# Patient Record
Sex: FEMALE | Race: WHITE | NOT HISPANIC OR LATINO | URBAN - METROPOLITAN AREA
[De-identification: names, ages, dates, MRNs, and addresses within clinical notes are randomized per-mention and may not be internally consistent; named-entity substitution may affect disease eponyms.]

---

## 2020-03-25 ENCOUNTER — OFFICE VISIT (OUTPATIENT)
Dept: FAMILY MEDICINE | Facility: CLINIC | Age: 85
End: 2020-03-25
Payer: MEDICARE

## 2020-03-25 DIAGNOSIS — R53.1 WEAKNESS: ICD-10-CM
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
## 2020-03-25 DIAGNOSIS — M25.571 ACUTE RIGHT ANKLE PAIN: ICD-10-CM

## 2020-03-25 DIAGNOSIS — M79.604 RIGHT LEG PAIN: ICD-10-CM

## 2020-03-25 PROCEDURE — 1159F PR MEDICATION LIST DOCUMENTED IN MEDICAL RECORD: ICD-10-PCS | Mod: 95,,, | Performed by: INTERNAL MEDICINE

## 2020-03-25 PROCEDURE — 1159F MED LIST DOCD IN RCRD: CPT | Mod: 95,,, | Performed by: INTERNAL MEDICINE

## 2020-03-25 PROCEDURE — 99204 OFFICE O/P NEW MOD 45 MIN: CPT | Mod: 95,,, | Performed by: INTERNAL MEDICINE

## 2020-03-25 PROCEDURE — 99204 PR OFFICE/OUTPT VISIT, NEW, LEVL IV, 45-59 MIN: ICD-10-PCS | Mod: 95,,, | Performed by: INTERNAL MEDICINE

## 2020-03-25 NOTE — PROGRESS NOTES
SUBJECTIVE     No chief complaint on file.      HPI  Cece Figueroa is a 89 y.o. female with multiple medical diagnoses as listed in the medical history and problem list that presents for evaluation of fall Friday night trying to get into her wheelchair. HPI provided by pt's daughter. Pt was transferring to the chair, but it slipped causing her to fall onto her RLE. Pt required help up into the chair from family. Denies any head trauma and is at her norm. EMS came and tried to bring her to the hospital, but she refused. Pt now has RLE pain from the knee on down, mostly ankle pain. Her pain is and intermittent throbbing at a 6-7/10. +swelling to the ankle. Pt has not been able to apply pressure 2/2 pain. Of note, pt lives alone and needs more assistance after this fall.    PAST MEDICAL HISTORY:  History reviewed. No pertinent past medical history.    PAST SURGICAL HISTORY:  History reviewed. No pertinent surgical history.    SOCIAL HISTORY:  Social History     Socioeconomic History    Marital status:      Spouse name: Not on file    Number of children: Not on file    Years of education: Not on file    Highest education level: Not on file   Occupational History    Not on file   Social Needs    Financial resource strain: Not on file    Food insecurity:     Worry: Not on file     Inability: Not on file    Transportation needs:     Medical: Not on file     Non-medical: Not on file   Tobacco Use    Smoking status: Not on file   Substance and Sexual Activity    Alcohol use: Not on file    Drug use: Not on file    Sexual activity: Not on file   Lifestyle    Physical activity:     Days per week: 0 days     Minutes per session: 0 min    Stress: Not at all   Relationships    Social connections:     Talks on phone: More than three times a week     Gets together: Never     Attends [employer] service: Not on file     Active member of club or organization: No     Attends meetings of clubs or  organizations: Never     Relationship status:    Other Topics Concern    Not on file   Social History Narrative    Not on file       FAMILY HISTORY:  History reviewed. No pertinent family history.    ALLERGIES AND MEDICATIONS: updated and reviewed.  Review of patient's allergies indicates:  No Known Allergies  No current outpatient medications on file.     No current facility-administered medications for this visit.        ROS  Review of Systems   Constitutional: Positive for activity change. Negative for unexpected weight change.   HENT: Positive for hearing loss. Negative for rhinorrhea and trouble swallowing.    Eyes: Positive for visual disturbance. Negative for discharge.   Respiratory: Negative for chest tightness and wheezing.    Cardiovascular: Negative for chest pain and palpitations.   Gastrointestinal: Negative for blood in stool, constipation, diarrhea and vomiting.   Endocrine: Negative for polydipsia and polyuria.   Genitourinary: Positive for difficulty urinating. Negative for dysuria, hematuria and menstrual problem.   Musculoskeletal: Positive for arthralgias and joint swelling. Negative for neck pain.   Skin: Negative for rash and wound.   Neurological: Negative for weakness and headaches.   Psychiatric/Behavioral: Positive for confusion. Negative for dysphoric mood.         OBJECTIVE     Physical Exam  There were no vitals filed for this visit. There is no height or weight on file to calculate BMI.            Physical Exam   Constitutional: No distress.   HENT:   Head: Normocephalic and atraumatic.   Pulmonary/Chest: Effort normal. No respiratory distress.   Neurological: She is alert.   Skin: Skin is warm and dry. No rash noted. No erythema.   Psychiatric: She has a normal mood and affect. Her behavior is normal. Judgment and thought content normal.         Health Maintenance       Date Due Completion Date    Lipid Panel 02/01/1929 ---    TETANUS VACCINE 02/01/1947 ---    Shingles  Vaccine (1 of 2) 02/01/1979 ---    Pneumococcal Vaccine (65+ Low/Medium Risk) (1 of 2 - PCV13) 02/01/1994 ---    Influenza Vaccine (1) 09/01/2019 ---            ASSESSMENT     89 y.o. female with     1. Fall from chair, initial encounter    2. Right leg pain    3. Acute right ankle pain    4. Weakness        PLAN:     1. Fall from chair, initial encounter  - Pt unable to apply pressure to RLE after fall and worsening debility, so will have her do xrays for further eval and start home health as she lives alone and needs more assistance  - X-Ray Ankle Complete Right; Future  - X-Ray Hip 2 View Right; Future  - X-Ray Knee 3 View Right; Future  - Ambulatory referral/consult to Home Health; Future    2. Right leg pain  - Recommend RICE therapy; pt to take Tylenol q6 prn pain  - X-Ray Ankle Complete Right; Future  - X-Ray Hip 2 View Right; Future  - X-Ray Knee 3 View Right; Future  - Ambulatory referral/consult to Home Health; Future    3. Acute right ankle pain  - As above  - X-Ray Ankle Complete Right; Future  - Ambulatory referral/consult to Home Health; Future    4. Weakness  - Ambulatory referral/consult to Home Health; Future        RTC in 1-2 weeks as needed for any acute worsening of current condition or failure to improve        Consult Start Time: 03/25/2020 13:52  Consult End Time: 03/25/2020 14:12            Janel Barajas MD  03/25/2020 1:53 PM        No follow-ups on file.

## 2020-03-26 ENCOUNTER — TELEPHONE (OUTPATIENT)
Dept: FAMILY MEDICINE | Facility: CLINIC | Age: 85
End: 2020-03-26

## 2020-03-26 NOTE — TELEPHONE ENCOUNTER
Received a phone call from [facility] requesting physical theraphy for patient , verbal ok per DR Karl payne .

## 2020-03-27 ENCOUNTER — TELEPHONE (OUTPATIENT)
Dept: FAMILY MEDICINE | Facility: CLINIC | Age: 85
End: 2020-03-27

## 2020-03-27 DIAGNOSIS — S82.64XA CLOSED NONDISPLACED FRACTURE OF LATERAL MALLEOLUS OF RIGHT FIBULA, INITIAL ENCOUNTER: Primary | ICD-10-CM

## 2020-03-27 PROBLEM — S72.051A CLOSED FRACTURE OF HEAD OF RIGHT FEMUR: Status: ACTIVE | Noted: 2020-03-27

## 2020-03-27 PROBLEM — M17.0 PRIMARY OSTEOARTHRITIS OF BOTH KNEES: Status: ACTIVE | Noted: 2020-03-27

## 2020-03-28 PROCEDURE — G0180 PR HOME HEALTH MD CERTIFICATION: ICD-10-PCS | Mod: ,,, | Performed by: INTERNAL MEDICINE

## 2020-03-28 PROCEDURE — G0180 MD CERTIFICATION HHA PATIENT: HCPCS | Mod: ,,, | Performed by: INTERNAL MEDICINE

## 2020-03-30 ENCOUNTER — ANESTHESIA EVENT (OUTPATIENT)
Dept: SURGERY | Facility: HOSPITAL | Age: 85
DRG: 481 | End: 2020-03-30
Payer: MEDICARE

## 2020-03-30 ENCOUNTER — HOSPITAL ENCOUNTER (INPATIENT)
Facility: HOSPITAL | Age: 85
LOS: 3 days | Discharge: HOME HEALTH | DRG: 481 | End: 2020-04-02
Attending: ORTHOPAEDIC SURGERY | Admitting: ORTHOPAEDIC SURGERY
Payer: MEDICARE

## 2020-03-30 ENCOUNTER — TELEPHONE (OUTPATIENT)
Dept: ADMINISTRATIVE | Facility: HOSPITAL | Age: 85
End: 2020-03-30

## 2020-03-30 ENCOUNTER — TELEPHONE (OUTPATIENT)
Dept: ORTHOPEDICS | Facility: CLINIC | Age: 85
End: 2020-03-30

## 2020-03-30 ENCOUNTER — ANESTHESIA (OUTPATIENT)
Dept: SURGERY | Facility: HOSPITAL | Age: 85
DRG: 481 | End: 2020-03-30
Payer: MEDICARE

## 2020-03-30 DIAGNOSIS — Z01.818 PRE-OP EVALUATION: ICD-10-CM

## 2020-03-30 DIAGNOSIS — S72.009A: ICD-10-CM

## 2020-03-30 DIAGNOSIS — S72.001A CLOSED FRACTURE OF RIGHT HIP, INITIAL ENCOUNTER: Primary | ICD-10-CM

## 2020-03-30 DIAGNOSIS — Z01.818 PRE-OP EXAM: ICD-10-CM

## 2020-03-30 LAB
ABO + RH BLD: NORMAL
ANION GAP SERPL CALC-SCNC: 9 MMOL/L (ref 8–16)
BASOPHILS # BLD AUTO: 0.02 K/UL (ref 0–0.2)
BASOPHILS NFR BLD: 0.2 % (ref 0–1.9)
BLD GP AB SCN CELLS X3 SERPL QL: NORMAL
BUN SERPL-MCNC: 17 MG/DL (ref 10–30)
CALCIUM SERPL-MCNC: 10 MG/DL (ref 8.7–10.5)
CHLORIDE SERPL-SCNC: 102 MMOL/L (ref 95–110)
CO2 SERPL-SCNC: 26 MMOL/L (ref 23–29)
CREAT SERPL-MCNC: 0.7 MG/DL (ref 0.5–1.4)
DIFFERENTIAL METHOD: ABNORMAL
EOSINOPHIL # BLD AUTO: 0.1 K/UL (ref 0–0.5)
EOSINOPHIL NFR BLD: 1.2 % (ref 0–8)
ERYTHROCYTE [DISTWIDTH] IN BLOOD BY AUTOMATED COUNT: 13.7 % (ref 11.5–14.5)
EST. GFR  (AFRICAN AMERICAN): >60 ML/MIN/1.73 M^2
EST. GFR  (NON AFRICAN AMERICAN): >60 ML/MIN/1.73 M^2
GLUCOSE SERPL-MCNC: 139 MG/DL (ref 70–110)
HCT VFR BLD AUTO: 34.4 % (ref 37–48.5)
HGB BLD-MCNC: 10.5 G/DL (ref 12–16)
IMM GRANULOCYTES # BLD AUTO: 0.09 K/UL (ref 0–0.04)
IMM GRANULOCYTES NFR BLD AUTO: 0.9 % (ref 0–0.5)
LYMPHOCYTES # BLD AUTO: 1.3 K/UL (ref 1–4.8)
LYMPHOCYTES NFR BLD: 13.7 % (ref 18–48)
MCH RBC QN AUTO: 28 PG (ref 27–31)
MCHC RBC AUTO-ENTMCNC: 30.5 G/DL (ref 32–36)
MCV RBC AUTO: 92 FL (ref 82–98)
MONOCYTES # BLD AUTO: 0.8 K/UL (ref 0.3–1)
MONOCYTES NFR BLD: 8.2 % (ref 4–15)
NEUTROPHILS # BLD AUTO: 7.3 K/UL (ref 1.8–7.7)
NEUTROPHILS NFR BLD: 75.8 % (ref 38–73)
NRBC BLD-RTO: 0 /100 WBC
PLATELET # BLD AUTO: 385 K/UL (ref 150–350)
PMV BLD AUTO: 9.5 FL (ref 9.2–12.9)
POTASSIUM SERPL-SCNC: 3.9 MMOL/L (ref 3.5–5.1)
RBC # BLD AUTO: 3.75 M/UL (ref 4–5.4)
SODIUM SERPL-SCNC: 137 MMOL/L (ref 136–145)
WBC # BLD AUTO: 9.66 K/UL (ref 3.9–12.7)

## 2020-03-30 PROCEDURE — 63600175 PHARM REV CODE 636 W HCPCS: Performed by: ORTHOPAEDIC SURGERY

## 2020-03-30 PROCEDURE — 71000039 HC RECOVERY, EACH ADD'L HOUR: Performed by: ORTHOPAEDIC SURGERY

## 2020-03-30 PROCEDURE — D9220A PRA ANESTHESIA: Mod: CRNA,,, | Performed by: NURSE ANESTHETIST, CERTIFIED REGISTERED

## 2020-03-30 PROCEDURE — 37000008 HC ANESTHESIA 1ST 15 MINUTES: Performed by: ORTHOPAEDIC SURGERY

## 2020-03-30 PROCEDURE — D9220A PRA ANESTHESIA: Mod: ANES,,, | Performed by: ANESTHESIOLOGY

## 2020-03-30 PROCEDURE — 27201423 OPTIME MED/SURG SUP & DEVICES STERILE SUPPLY: Performed by: ORTHOPAEDIC SURGERY

## 2020-03-30 PROCEDURE — 36000711: Performed by: ORTHOPAEDIC SURGERY

## 2020-03-30 PROCEDURE — D9220A PRA ANESTHESIA: ICD-10-PCS | Mod: CRNA,,, | Performed by: NURSE ANESTHETIST, CERTIFIED REGISTERED

## 2020-03-30 PROCEDURE — 93005 ELECTROCARDIOGRAM TRACING: CPT

## 2020-03-30 PROCEDURE — C9290 INJ, BUPIVACAINE LIPOSOME: HCPCS | Performed by: ORTHOPAEDIC SURGERY

## 2020-03-30 PROCEDURE — 86850 RBC ANTIBODY SCREEN: CPT

## 2020-03-30 PROCEDURE — 71000033 HC RECOVERY, INTIAL HOUR: Performed by: ORTHOPAEDIC SURGERY

## 2020-03-30 PROCEDURE — C1713 ANCHOR/SCREW BN/BN,TIS/BN: HCPCS | Performed by: ORTHOPAEDIC SURGERY

## 2020-03-30 PROCEDURE — 36000710: Performed by: ORTHOPAEDIC SURGERY

## 2020-03-30 PROCEDURE — 93010 ELECTROCARDIOGRAM REPORT: CPT | Mod: ,,, | Performed by: INTERNAL MEDICINE

## 2020-03-30 PROCEDURE — 85025 COMPLETE CBC W/AUTO DIFF WBC: CPT

## 2020-03-30 PROCEDURE — 11000001 HC ACUTE MED/SURG PRIVATE ROOM

## 2020-03-30 PROCEDURE — 25000003 PHARM REV CODE 250: Performed by: NURSE ANESTHETIST, CERTIFIED REGISTERED

## 2020-03-30 PROCEDURE — 36415 COLL VENOUS BLD VENIPUNCTURE: CPT

## 2020-03-30 PROCEDURE — C1769 GUIDE WIRE: HCPCS | Performed by: ORTHOPAEDIC SURGERY

## 2020-03-30 PROCEDURE — 25000003 PHARM REV CODE 250: Performed by: ORTHOPAEDIC SURGERY

## 2020-03-30 PROCEDURE — D9220A PRA ANESTHESIA: ICD-10-PCS | Mod: ANES,,, | Performed by: ANESTHESIOLOGY

## 2020-03-30 PROCEDURE — 86920 COMPATIBILITY TEST SPIN: CPT

## 2020-03-30 PROCEDURE — 37000009 HC ANESTHESIA EA ADD 15 MINS: Performed by: ORTHOPAEDIC SURGERY

## 2020-03-30 PROCEDURE — 63600175 PHARM REV CODE 636 W HCPCS: Performed by: NURSE ANESTHETIST, CERTIFIED REGISTERED

## 2020-03-30 PROCEDURE — 93010 EKG 12-LEAD: ICD-10-PCS | Mod: ,,, | Performed by: INTERNAL MEDICINE

## 2020-03-30 PROCEDURE — 80048 BASIC METABOLIC PNL TOTAL CA: CPT

## 2020-03-30 DEVICE — SCREW LOCKING 5 X 42.5: Type: IMPLANTABLE DEVICE | Site: HIP | Status: FUNCTIONAL

## 2020-03-30 DEVICE — SCREW LAG TITANIUM 10.5X95: Type: IMPLANTABLE DEVICE | Site: HIP | Status: FUNCTIONAL

## 2020-03-30 RX ORDER — SODIUM CHLORIDE 0.9 % (FLUSH) 0.9 %
10 SYRINGE (ML) INJECTION
Status: DISCONTINUED | OUTPATIENT
Start: 2020-03-30 | End: 2020-04-02 | Stop reason: HOSPADM

## 2020-03-30 RX ORDER — MORPHINE SULFATE 10 MG/ML
2 INJECTION INTRAMUSCULAR; INTRAVENOUS; SUBCUTANEOUS EVERY 4 HOURS PRN
Status: DISCONTINUED | OUTPATIENT
Start: 2020-03-30 | End: 2020-04-02 | Stop reason: HOSPADM

## 2020-03-30 RX ORDER — SODIUM CHLORIDE, SODIUM LACTATE, POTASSIUM CHLORIDE, CALCIUM CHLORIDE 600; 310; 30; 20 MG/100ML; MG/100ML; MG/100ML; MG/100ML
INJECTION, SOLUTION INTRAVENOUS CONTINUOUS
Status: ACTIVE | OUTPATIENT
Start: 2020-03-30 | End: 2020-03-30

## 2020-03-30 RX ORDER — MUPIROCIN 20 MG/G
OINTMENT TOPICAL 2 TIMES DAILY
Status: DISCONTINUED | OUTPATIENT
Start: 2020-03-30 | End: 2020-04-02 | Stop reason: HOSPADM

## 2020-03-30 RX ORDER — ACETAMINOPHEN 10 MG/ML
1000 INJECTION, SOLUTION INTRAVENOUS ONCE
Status: DISCONTINUED | OUTPATIENT
Start: 2020-03-30 | End: 2020-03-30

## 2020-03-30 RX ORDER — HYDROCODONE BITARTRATE AND ACETAMINOPHEN 5; 325 MG/1; MG/1
1 TABLET ORAL EVERY 4 HOURS PRN
Status: DISCONTINUED | OUTPATIENT
Start: 2020-03-30 | End: 2020-03-30

## 2020-03-30 RX ORDER — ACETAMINOPHEN 10 MG/ML
1000 INJECTION, SOLUTION INTRAVENOUS ONCE
Status: COMPLETED | OUTPATIENT
Start: 2020-03-30 | End: 2020-03-30

## 2020-03-30 RX ORDER — DOCUSATE SODIUM 100 MG/1
100 CAPSULE, LIQUID FILLED ORAL 2 TIMES DAILY
Status: DISCONTINUED | OUTPATIENT
Start: 2020-03-30 | End: 2020-04-02 | Stop reason: HOSPADM

## 2020-03-30 RX ORDER — ENOXAPARIN SODIUM 100 MG/ML
40 INJECTION SUBCUTANEOUS
Status: DISCONTINUED | OUTPATIENT
Start: 2020-03-31 | End: 2020-04-02 | Stop reason: HOSPADM

## 2020-03-30 RX ORDER — FENTANYL CITRATE 50 UG/ML
INJECTION, SOLUTION INTRAMUSCULAR; INTRAVENOUS
Status: DISCONTINUED | OUTPATIENT
Start: 2020-03-30 | End: 2020-03-30

## 2020-03-30 RX ORDER — ROCURONIUM BROMIDE 10 MG/ML
INJECTION, SOLUTION INTRAVENOUS
Status: DISCONTINUED | OUTPATIENT
Start: 2020-03-30 | End: 2020-03-30

## 2020-03-30 RX ORDER — ONDANSETRON 2 MG/ML
INJECTION INTRAMUSCULAR; INTRAVENOUS
Status: DISCONTINUED | OUTPATIENT
Start: 2020-03-30 | End: 2020-03-30

## 2020-03-30 RX ORDER — OXYCODONE HYDROCHLORIDE 5 MG/1
5 TABLET ORAL EVERY 4 HOURS PRN
Status: DISCONTINUED | OUTPATIENT
Start: 2020-03-30 | End: 2020-04-02 | Stop reason: HOSPADM

## 2020-03-30 RX ORDER — GLYCOPYRROLATE 0.2 MG/ML
INJECTION INTRAMUSCULAR; INTRAVENOUS
Status: DISCONTINUED | OUTPATIENT
Start: 2020-03-30 | End: 2020-03-30

## 2020-03-30 RX ORDER — HYDRALAZINE HYDROCHLORIDE 20 MG/ML
10 INJECTION INTRAMUSCULAR; INTRAVENOUS EVERY 6 HOURS PRN
Status: DISCONTINUED | OUTPATIENT
Start: 2020-03-30 | End: 2020-04-02 | Stop reason: HOSPADM

## 2020-03-30 RX ORDER — NEOSTIGMINE METHYLSULFATE 1 MG/ML
INJECTION, SOLUTION INTRAVENOUS
Status: DISCONTINUED | OUTPATIENT
Start: 2020-03-30 | End: 2020-03-30

## 2020-03-30 RX ORDER — CEFAZOLIN SODIUM 2 G/50ML
2 SOLUTION INTRAVENOUS
Status: DISCONTINUED | OUTPATIENT
Start: 2020-03-30 | End: 2020-03-30

## 2020-03-30 RX ORDER — FENTANYL CITRATE 50 UG/ML
25 INJECTION, SOLUTION INTRAMUSCULAR; INTRAVENOUS EVERY 5 MIN PRN
Status: DISCONTINUED | OUTPATIENT
Start: 2020-03-30 | End: 2020-04-02 | Stop reason: HOSPADM

## 2020-03-30 RX ORDER — POLYETHYLENE GLYCOL 3350 17 G/17G
17 POWDER, FOR SOLUTION ORAL DAILY
Status: DISCONTINUED | OUTPATIENT
Start: 2020-03-30 | End: 2020-04-02 | Stop reason: HOSPADM

## 2020-03-30 RX ORDER — ONDANSETRON 2 MG/ML
4 INJECTION INTRAMUSCULAR; INTRAVENOUS EVERY 12 HOURS PRN
Status: DISCONTINUED | OUTPATIENT
Start: 2020-03-30 | End: 2020-04-02 | Stop reason: HOSPADM

## 2020-03-30 RX ORDER — CEFAZOLIN SODIUM 2 G/50ML
2 SOLUTION INTRAVENOUS
Status: COMPLETED | OUTPATIENT
Start: 2020-03-31 | End: 2020-03-31

## 2020-03-30 RX ORDER — PHENYLEPHRINE HYDROCHLORIDE 10 MG/ML
INJECTION INTRAVENOUS
Status: DISCONTINUED | OUTPATIENT
Start: 2020-03-30 | End: 2020-03-30

## 2020-03-30 RX ORDER — LIDOCAINE HYDROCHLORIDE 20 MG/ML
INJECTION INTRAVENOUS
Status: DISCONTINUED | OUTPATIENT
Start: 2020-03-30 | End: 2020-03-30

## 2020-03-30 RX ORDER — MUPIROCIN 20 MG/G
OINTMENT TOPICAL 2 TIMES DAILY
Status: DISCONTINUED | OUTPATIENT
Start: 2020-03-30 | End: 2020-03-30

## 2020-03-30 RX ORDER — PROPOFOL 10 MG/ML
VIAL (ML) INTRAVENOUS
Status: DISCONTINUED | OUTPATIENT
Start: 2020-03-30 | End: 2020-03-30

## 2020-03-30 RX ORDER — ACETAMINOPHEN 325 MG/1
650 TABLET ORAL EVERY 6 HOURS PRN
Status: DISCONTINUED | OUTPATIENT
Start: 2020-03-30 | End: 2020-04-02 | Stop reason: HOSPADM

## 2020-03-30 RX ADMIN — NEOSTIGMINE METHYLSULFATE 4 MG: 1 INJECTION INTRAVENOUS at 04:03

## 2020-03-30 RX ADMIN — PHENYLEPHRINE HYDROCHLORIDE 150 MCG: 10 INJECTION INTRAVENOUS at 04:03

## 2020-03-30 RX ADMIN — CEFAZOLIN SODIUM 2 G: 1 POWDER, FOR SOLUTION INTRAMUSCULAR; INTRAVENOUS at 03:03

## 2020-03-30 RX ADMIN — SODIUM CHLORIDE, SODIUM LACTATE, POTASSIUM CHLORIDE, AND CALCIUM CHLORIDE: .6; .31; .03; .02 INJECTION, SOLUTION INTRAVENOUS at 04:03

## 2020-03-30 RX ADMIN — PHENYLEPHRINE HYDROCHLORIDE 50 MCG: 10 INJECTION INTRAVENOUS at 04:03

## 2020-03-30 RX ADMIN — MUPIROCIN: 20 OINTMENT TOPICAL at 09:03

## 2020-03-30 RX ADMIN — FENTANYL CITRATE 25 MCG: 50 INJECTION INTRAMUSCULAR; INTRAVENOUS at 04:03

## 2020-03-30 RX ADMIN — PROPOFOL 70 MG: 10 INJECTION, EMULSION INTRAVENOUS at 03:03

## 2020-03-30 RX ADMIN — DOCUSATE SODIUM 100 MG: 100 CAPSULE, LIQUID FILLED ORAL at 09:03

## 2020-03-30 RX ADMIN — SODIUM CHLORIDE, SODIUM LACTATE, POTASSIUM CHLORIDE, AND CALCIUM CHLORIDE: .6; .31; .03; .02 INJECTION, SOLUTION INTRAVENOUS at 03:03

## 2020-03-30 RX ADMIN — ACETAMINOPHEN 1000 MG: 10 INJECTION, SOLUTION INTRAVENOUS at 06:03

## 2020-03-30 RX ADMIN — Medication 50 MG: at 03:03

## 2020-03-30 RX ADMIN — PHENYLEPHRINE HYDROCHLORIDE 200 MCG: 10 INJECTION INTRAVENOUS at 04:03

## 2020-03-30 RX ADMIN — ROCURONIUM BROMIDE 20 MG: 10 INJECTION, SOLUTION INTRAVENOUS at 03:03

## 2020-03-30 RX ADMIN — FENTANYL CITRATE 50 MCG: 50 INJECTION INTRAMUSCULAR; INTRAVENOUS at 03:03

## 2020-03-30 RX ADMIN — PHENYLEPHRINE HYDROCHLORIDE 100 MCG: 10 INJECTION INTRAVENOUS at 04:03

## 2020-03-30 RX ADMIN — ONDANSETRON 4 MG: 2 INJECTION, SOLUTION INTRAMUSCULAR; INTRAVENOUS at 04:03

## 2020-03-30 RX ADMIN — GLYCOPYRROLATE 0.4 MG: 0.2 INJECTION, SOLUTION INTRAMUSCULAR; INTRAVENOUS at 04:03

## 2020-03-30 NOTE — BRIEF OP NOTE
Operative Note       Surgery Date: 3/30/2020     Surgeon(s) and Role:     * Armen Norwood MD - Primary    Pre-op Diagnosis:  Closed fracture of head of right femur, initial encounter [S72.015T]    Post-op Diagnosis:  S/p IM nail    Procedure(s) (LRB):  INSERTION, INTRAMEDULLARY ARMANDO, FEMUR-HIP (Right)    Anesthesia: General    Findings/Key Components:  C/w pre op dx    Core Measure Documentation:  Were antibiotics extended? No  Was the patient administered a VTE Prophylaxis? No. Short procedure; low risk  Estimated Blood Loss: 100ml  IVF: 1000ml  UOP: 210 cc           Specimens (From admission, onward)    None        Implants: patti gamma bridgette  Complications: none           Disposition: PACU - hemodynamically stable.           Condition: Stable    Attestation:  I was present for the entire procedure.

## 2020-03-30 NOTE — H&P
Chief Complaint: RIGHT hip pain right ankle pain    History of Present Illness:  Cece Figueroa is a very pleasant 89 y.o. female who presents with right hip and ankle pain after sustaining a mechanical fall from standing height several days ago.  She presents to  ago. The pain is worse with any movement and relieved with rest.  Patient denies any additional injuries.  There was no loss of consciousness.  She denies having hit her head.  She presents today to clinic with her son who is her power of .  She reports that she has not been able to stand or bear any weight since her above-mentioned follow-up.  She reports and he reports as well that prior to this most recent fall she was able to stand and ambulate using her wheelchair as a walker and accomplished most of her ADLs.  She reports no relevant past medical history.  She reports a remote history of polymyalgia rheumatica and diabetes both of which she reports have been asymptomatic and resolved respectively.  Review of Systems:    Noncontributory except for above       No past medical history on file.    Past Surgical History:   No past surgical history on file.    Social History:  negative tobacco abuse    Allergies:  Review of patient's allergies indicates:  No Known Allergies    Medications:  Current Facility-Administered Medications   Medication Dose Route Frequency Provider Last Rate Last Dose    [START ON 3/31/2020] enoxaparin injection 40 mg  40 mg Subcutaneous Q24H Armen Norwood MD           Physical Exam:   General:  Well developed and well nourished age appropriate female in no acute distress  Cardiovascular: regular rate and rhythm  Respiratory:  Nonlabored breathing, no wheezing  Abdomen: soft, non-tender, non-distended  Musculoskeletal:   Severe pain with any range of motion of right hip  No laceration no abrasion  Right knee full range of motion no effusion no laceration or abrasion no tenderness to palpation  Right ankle  severe tenderness to palpation at lateral malleolus mildly at medial malleolus to minimal ecchymosis no laceration good range of motion sensation intact to light touch throughout bilateral lower extremity palpable distal pulses bilateral lower extremity  Neuro:  Sensation motor intact throughout    Imaging:  Imaging revealed:  Right basicervical femoral neck fracture and right lateral malleolus nondisplaced fracture seen on x-rays.  Full length femur films and chest x-ray pending  EKG and stat labs pending    Diagnosis:  89-year-old female with a right basicervical femoral neck fracture and a right nondisplaced lateral malleolus fracture admitted directly to me for operative fixation of her right hip after the above-mentioned mechanical fall from standing height.  Patient was directly admitted to my service in light of the current corona virus epidemic 2 attempt to keep the patient out of the emergency room which is saturated with patient's with Covid 19.  Had extensive conversation with the patient as well as the son regarding the risks benefits and alternatives as well as anticipated convalescence of operative versus non operative management.  Will attempt to admit the patient to an area the hospital that has no Covid 19 known cases.  Discussed case with Anesthesia and Internal Medicine  Informed consent signed by the son her power of  in my presence today and witnessed.  Surgical site marked    Plan:   1. We discussed both surgical and non surgical options.  Patient and her power of  son would like to proceed with surgical intervention.  Patient understands the inherent risks and benefits and would like to proceed with the following surgical intervention on today's date.  All consents were signed.    Planned Surgical Intervention:  IM nail of right hip non operative management of right lateral malleolus fracture    MD Cuco

## 2020-03-30 NOTE — OP NOTE
03/30/2020    PREOPERATIVE DIAGNOSIS:  Right basicervical femoral neck fx hip fracture.    RIGHT lateral malleolus fracture    POSTOPERATIVE DIAGNOSIS:  Right basicervical hip fracture.    RIGHT lateral malleolus fracture    PROCEDURE:  Right hip IM nailing for intertrochanteric hip fracture. (CPT# 44908)    Non-operative management of RIGHT lateral malleolus fracture    SURGEON:  MARIE Norwood    ASSISTANT:   none.    ANESTHESIA:  general.    ESTIMATED BLOOD LOSS:  100 mL  IVF: 1000ml    UOP: 210    INDICATIONS:  The patient is a 89 y.o. who fell previously.  Clinical evaluation was consistent with hip pathology and radiographs revealed an intertrochanteric hip fracture and a non displaced RIGHT lateral malleolus fracture.  She was admitted to the hospital where preoperative medical clearance was obtained and it was recommended at this time to proceed with intramedullary nailing.  Risks and complications were discussed including, but not limited to risks of anesthetic complications, infections, wound healing complications, nonunion, malunion, hardware failure, pain, stiffness, DVT, pulmonary embolism and death among others and she elected to proceed.    DESCRIPTION OF PROCEDURE:  The patient was taken Operating Room where anesthesia was administered by the Anesthesia Department.  She was then placed in the fracture table and all superficial neurovascular structures were well padded.  The right lower extremity was then sterilely prepped and draped in the normal fashion after fluroscopy was used to confirm adequate reduction.   .    A 4 cm longitudinal incision was made just proximal to the greater trochanter.  The subcutaneous tissue and gluteal fascia were incised.  A threaded guide pin was then placed in the tip of the greater trochanter and extended and introduced into the proximal femur under AP and lateral fluoroscopy.  The proximal reamer was then used to ream proximally. A ball-tipped guide kim was then placed  through the entry site down to the physeal scar of the femur.  This was measured at 380 mm and then was reamed sequentially to a 12.5 mm reamer.  A Malcolm long Gamma3 size 380 mm x 12.5 mm was then passed over the guidewire, which was subsequently removed.  The proximal interlocking device was then placed and a 3-cm longitudinal incision was made over the lateral aspect of the proximal thigh and the fascia ashly was incised.  A threaded guide pin was then placed through the lateral cortex of the femur through the femoral neck and into the femoral head and measured at 95 mm and over reamed.  A 95 mm proximal screw was then placed under fluoroscopy and satisfactory position was noted on AP and lateral fluoroscopy and the setscrew was then set. The proximal interlocking device was then removed.    Distal interlocking was done with a single lateral to medial interlocking screw through a 1 cm incision under fluoroscopic guidance.  All wounds were then thoroughly irrigated.  Subcutaneous tissues were closed with interrupted inverted of  Vicryl.  Skin was approximated using skin staples.  Sterile dressing was applied.  Anesthesia was reversed and she was returned to the Postanesthesia Care Unit in stable condition.    As the attending surgeon I was physically present for the key/critical portions of the procedure.

## 2020-03-30 NOTE — CONSULTS
[facility] Hospital Medicine  Consult Note    Patient Name: Cece Figueroa  MRN: [id number]  Admission Date: 3/30/2020  Hospital Length of Stay: 0 days  Attending Physician: Armen Norwood MD   Primary Care Provider: Janel Barajas MD           Patient information was obtained from patient and ER records.     Consults  Subjective:     Principal Problem: <principal problem not specified>    Chief Complaint: No chief complaint on file.       HPI: 89 y.o. female who presents with right hip and ankle pain after sustaining a mechanical fall from standing height several days ago.  She presents to  ago. The pain is worse with any movement and relieved with rest.  Patient denies any additional injuries.  There was no loss of consciousness.  She denies having hit her head.  She presents today to clinic with her son who is her power of .  She reports that she has not been able to stand or bear any weight since her above-mentioned follow-up.  She reports and he reports as well that prior to this most recent fall she was able to stand and ambulate using her wheelchair as a walker and accomplished most of her ADLs.  She reports no relevant past medical history.  She reports a remote history of polymyalgia rheumatica and diabetes both of which she reports have been asymptomatic and resolved respectively.    Pt admitted to orthopedic surgery service. Hospital medicine consulted for clearance and medical management.    History reviewed. No pertinent past medical history.    History reviewed. No pertinent surgical history.    Review of patient's allergies indicates:  No Known Allergies    No current facility-administered medications on file prior to encounter.      No current outpatient medications on file prior to encounter.     Family History     None        Tobacco Use    Smoking status: Not on file   Substance and Sexual Activity    Alcohol use: Not on file    Drug use: Not on file     Sexual activity: Not on file     Review of Systems   Constitutional: Negative.    HENT: Negative.    Eyes: Negative.    Cardiovascular: Negative.    Gastrointestinal: Negative.    Endocrine: Negative.    Genitourinary: Negative.    Musculoskeletal: Positive for arthralgias and gait problem.   Psychiatric/Behavioral: Negative.      Objective:     Vital Signs (Most Recent):  Temp: 97.9 °F (36.6 °C) (03/30/20 1503)  Pulse: 100 (03/30/20 1503)  Resp: 16 (03/30/20 1503)  BP: (!) 158/71 (03/30/20 1503)  SpO2: 95 % (03/30/20 1503) Vital Signs (24h Range):  Temp:  [97.9 °F (36.6 °C)] 97.9 °F (36.6 °C)  Pulse:  [100] 100  Resp:  [16] 16  SpO2:  [95 %] 95 %  BP: (158)/(71) 158/71     Weight: 60.2 kg (132 lb 11.5 oz)  There is no height or weight on file to calculate BMI.    Physical Exam   Constitutional: She is oriented to person, place, and time. She appears well-developed and well-nourished. No distress.   HENT:   Head: Normocephalic and atraumatic.   Mouth/Throat: Oropharynx is clear and moist.   Eyes: Pupils are equal, round, and reactive to light. EOM are normal.   Neck: Normal range of motion. Neck supple.   Cardiovascular: Regular rhythm and intact distal pulses.   Pulmonary/Chest: Effort normal and breath sounds normal. No respiratory distress.   Abdominal: Soft. Bowel sounds are normal. She exhibits no distension.   Musculoskeletal: She exhibits tenderness and deformity.   Neurological: She is oriented to person, place, and time.   Skin: Capillary refill takes less than 2 seconds.   Psychiatric: She has a normal mood and affect. Her behavior is normal.       Significant Labs:   BMP:   Recent Labs   Lab 03/30/20  1423   *      K 3.9      CO2 26   BUN 17   CREATININE 0.7   CALCIUM 10.0     CBC:   Recent Labs   Lab 03/30/20  1423   WBC 9.66   HGB 10.5*   HCT 34.4*   *     Coagulation: No results for input(s): PT, INR, APTT in the last 48 hours.    Significant Imaging: I have reviewed and  interpreted all pertinent imaging results/findings within the past 24 hours.     EKG reviewed- no acute abnormalities  CXR: reviewed - chronic findings of interstitial disease     Assessment/Plan:     Closed fracture of right hip  Per primary - ortho   Patient is medically optimized and cleared for IM nail of right hip  Recommend IS post op  Hydralazine IV PRN - if hypertensive  DVT proph and pain control- per ortho       VTE Risk Mitigation (From admission, onward)         Ordered     enoxaparin injection 40 mg  Every 24 hours (non-standard times)      03/30/20 1407     IP VTE LOW RISK PATIENT  Once      03/30/20 1407     Place STANISLAV hose  Until discontinued      03/30/20 1407     Place sequential compression device  Until discontinued      03/30/20 1407                    Thank you for your consult. I will follow-up with patient. Please contact us if you have any additional questions.    Mckayla Corbett MD  Department of Hospital Medicine   [facility]

## 2020-03-30 NOTE — ASSESSMENT & PLAN NOTE
Per primary - ortho   Patient is medically optimized and cleared for IM nail of right hip  Recommend IS post op  Hydralazine IV PRN - if hypertensive  DVT proph and pain control- per ortho

## 2020-03-30 NOTE — TRANSFER OF CARE
Anesthesia Transfer of Care Note    Patient: Cece Figueroa    Procedure(s) Performed: Procedure(s) (LRB):  INSERTION, INTRAMEDULLARY ARMANDO, FEMUR-HIP (Right)    Patient location: PACU    Anesthesia Type: general    Transport from OR: Transported from OR on room air with adequate spontaneous ventilation    Post pain: adequate analgesia    Post assessment: no apparent anesthetic complications    Post vital signs: stable    Level of consciousness: awake, alert and responds to stimulation    Nausea/Vomiting: no nausea/vomiting    Complications: none    Transfer of care protocol was followed      Last vitals:   Visit Vitals  BP (!) 154/99 (BP Location: Right arm, Patient Position: Lying)   Pulse (!) 126   Temp 36.5 °C (97.7 °F) (Oral)   Resp 12   Wt 60.2 kg (132 lb 11.5 oz)   SpO2 99%   Breastfeeding? No

## 2020-03-30 NOTE — TELEPHONE ENCOUNTER
----- Message from Lauren Da Silva sent at 3/30/2020 10:58 AM CDT -----  Type: Patient Call Back    Who called: Nidhi with Pt reg    What is the request in detail: Rep states [facility] and asking if staff can start authorization for right fracture of hip. Rep states pt is on her way there now.     Can the clinic reply by [facility]? No     Would the patient rather a call back or a response via [facility]? Call back     Best call back number: [phone]    Additional Information:

## 2020-03-30 NOTE — ANESTHESIA PREPROCEDURE EVALUATION
03/30/2020    Pre-operative evaluation for Procedure(s) (LRB):  INSERTION, INTRAMEDULLARY ARMANDO, FEMUR-HIP (Right)    Cece Figueroa is a 89 y.o. female     Patient Active Problem List   Diagnosis    Primary osteoarthritis of both knees    Closed nondisplaced fracture of lateral malleolus of right fibula    Closed fracture of head of right femur    Fracture, hip       Review of patient's allergies indicates:  No Known Allergies    No current facility-administered medications on file prior to encounter.      No current outpatient medications on file prior to encounter.     VITALS  There were no vitals filed for this visit.    CBC:   Recent Labs     03/30/20  1423   WBC 9.66   RBC 3.75*   HGB 10.5*   HCT 34.4*   *   MCV 92   MCH 28.0   MCHC 30.5*         Anesthesia Evaluation    I have reviewed the Patient Summary Reports.     I have reviewed the Medications.     Review of Systems  Anesthesia Hx:  History of prior surgery of interest to airway management or planning: Denies Family Hx of Anesthesia complications.   Denies Personal Hx of Anesthesia complications.   Cardiovascular:   Sedentary  No cp no sob   Renal/:  Renal/ Normal     Hepatic/GI:  Hepatic/GI Normal    Musculoskeletal:   Arthritis  Femur fracture   Neurological:  Neurology Normal        Physical Exam  General:  Well nourished    Airway/Jaw/Neck:  Airway Findings: Mallampati: II TM Distance: < 4 cm      Dental:  Dental Findings: (dentures out)   Chest/Lungs:  Chest/Lungs Clear    Heart/Vascular:  Heart Findings: Normal       Mental Status:  Mental Status Findings:  Cooperative, Alert and Oriented         Anesthesia Plan  Type of Anesthesia, risks & benefits discussed:  Anesthesia Type:  general  Patient's Preference:   Intra-op Monitoring Plan: standard ASA monitors  Intra-op Monitoring Plan Comments:   Post Op Pain Control Plan: multimodal analgesia, IV/PO Opioids PRN and per primary service following discharge from PACU  Post Op  Pain Control Plan Comments:   Induction:    Beta Blocker:  Patient is not currently on a Beta-Blocker (No further documentation required).       Informed Consent: Patient understands risks and agrees with Anesthesia plan.  Questions answered. Anesthesia consent signed with patient.  ASA Score: 3     Day of Surgery Review of History & Physical:    H&P update referred to the provider.  H&P completed by Anesthesiologist.   Anesthesia Plan Notes: npo        Ready For Surgery From Anesthesia Perspective.

## 2020-03-30 NOTE — HPI
89 y.o. female who presents with right hip and ankle pain after sustaining a mechanical fall from standing height several days ago.  She presents to  ago. The pain is worse with any movement and relieved with rest.  Patient denies any additional injuries.  There was no loss of consciousness.  She denies having hit her head.  She presents today to clinic with her son who is her power of .  She reports that she has not been able to stand or bear any weight since her above-mentioned follow-up.  She reports and he reports as well that prior to this most recent fall she was able to stand and ambulate using her wheelchair as a walker and accomplished most of her ADLs.  She reports no relevant past medical history.  She reports a remote history of polymyalgia rheumatica and diabetes both of which she reports have been asymptomatic and resolved respectively.    Pt admitted to orthopedic surgery service. Hospital medicine consulted for clearance and medical management.

## 2020-03-30 NOTE — SUBJECTIVE & OBJECTIVE
History reviewed. No pertinent past medical history.    History reviewed. No pertinent surgical history.    Review of patient's allergies indicates:  No Known Allergies    No current facility-administered medications on file prior to encounter.      No current outpatient medications on file prior to encounter.     Family History     None        Tobacco Use    Smoking status: Not on file   Substance and Sexual Activity    Alcohol use: Not on file    Drug use: Not on file    Sexual activity: Not on file     Review of Systems   Constitutional: Negative.    HENT: Negative.    Eyes: Negative.    Cardiovascular: Negative.    Gastrointestinal: Negative.    Endocrine: Negative.    Genitourinary: Negative.    Musculoskeletal: Positive for arthralgias and gait problem.   Psychiatric/Behavioral: Negative.      Objective:     Vital Signs (Most Recent):  Temp: 97.9 °F (36.6 °C) (03/30/20 1503)  Pulse: 100 (03/30/20 1503)  Resp: 16 (03/30/20 1503)  BP: (!) 158/71 (03/30/20 1503)  SpO2: 95 % (03/30/20 1503) Vital Signs (24h Range):  Temp:  [97.9 °F (36.6 °C)] 97.9 °F (36.6 °C)  Pulse:  [100] 100  Resp:  [16] 16  SpO2:  [95 %] 95 %  BP: (158)/(71) 158/71     Weight: 60.2 kg (132 lb 11.5 oz)  There is no height or weight on file to calculate BMI.    Physical Exam   Constitutional: She is oriented to person, place, and time. She appears well-developed and well-nourished. No distress.   HENT:   Head: Normocephalic and atraumatic.   Mouth/Throat: Oropharynx is clear and moist.   Eyes: Pupils are equal, round, and reactive to light. EOM are normal.   Neck: Normal range of motion. Neck supple.   Cardiovascular: Regular rhythm and intact distal pulses.   Pulmonary/Chest: Effort normal and breath sounds normal. No respiratory distress.   Abdominal: Soft. Bowel sounds are normal. She exhibits no distension.   Musculoskeletal: She exhibits tenderness and deformity.   Neurological: She is oriented to person, place, and time.   Skin:  Capillary refill takes less than 2 seconds.   Psychiatric: She has a normal mood and affect. Her behavior is normal.       Significant Labs:   BMP:   Recent Labs   Lab 03/30/20  1423   *      K 3.9      CO2 26   BUN 17   CREATININE 0.7   CALCIUM 10.0     CBC:   Recent Labs   Lab 03/30/20  1423   WBC 9.66   HGB 10.5*   HCT 34.4*   *     Coagulation: No results for input(s): PT, INR, APTT in the last 48 hours.    Significant Imaging: I have reviewed and interpreted all pertinent imaging results/findings within the past 24 hours.     EKG reviewed- no acute abnormalities  CXR: reviewed - chronic findings of interstitial disease

## 2020-03-30 NOTE — TELEPHONE ENCOUNTER
----- Message from Orlando Padilla sent at 3/30/2020  8:10 AM CDT -----  Contact: Mckayla (daughter)  Type:  Patient Returning Call    Who Called: Mckayla (daughter)    Who Left Message for Patient: Karen     Does the patient know what this is regarding?: yes    Would the patient rather a call back or a response via [facility]? call    Best Call Back Number:  Samaria brother has POA and patient is currently with him ([phone])    Additional Information:

## 2020-03-31 ENCOUNTER — TELEPHONE (OUTPATIENT)
Dept: ADMINISTRATIVE | Facility: HOSPITAL | Age: 85
End: 2020-03-31

## 2020-03-31 LAB
ANION GAP SERPL CALC-SCNC: 6 MMOL/L (ref 8–16)
BASOPHILS # BLD AUTO: 0.02 K/UL (ref 0–0.2)
BASOPHILS NFR BLD: 0.2 % (ref 0–1.9)
BUN SERPL-MCNC: 14 MG/DL (ref 10–30)
CALCIUM SERPL-MCNC: 9.1 MG/DL (ref 8.7–10.5)
CHLORIDE SERPL-SCNC: 102 MMOL/L (ref 95–110)
CO2 SERPL-SCNC: 27 MMOL/L (ref 23–29)
CREAT SERPL-MCNC: 0.7 MG/DL (ref 0.5–1.4)
DIFFERENTIAL METHOD: ABNORMAL
EOSINOPHIL # BLD AUTO: 0.1 K/UL (ref 0–0.5)
EOSINOPHIL NFR BLD: 0.6 % (ref 0–8)
ERYTHROCYTE [DISTWIDTH] IN BLOOD BY AUTOMATED COUNT: 13.6 % (ref 11.5–14.5)
EST. GFR  (AFRICAN AMERICAN): >60 ML/MIN/1.73 M^2
EST. GFR  (NON AFRICAN AMERICAN): >60 ML/MIN/1.73 M^2
GLUCOSE SERPL-MCNC: 141 MG/DL (ref 70–110)
HCT VFR BLD AUTO: 27 % (ref 37–48.5)
HGB BLD-MCNC: 8.1 G/DL (ref 12–16)
IMM GRANULOCYTES # BLD AUTO: 0.07 K/UL (ref 0–0.04)
IMM GRANULOCYTES NFR BLD AUTO: 0.7 % (ref 0–0.5)
LYMPHOCYTES # BLD AUTO: 1 K/UL (ref 1–4.8)
LYMPHOCYTES NFR BLD: 9.9 % (ref 18–48)
MCH RBC QN AUTO: 27.7 PG (ref 27–31)
MCHC RBC AUTO-ENTMCNC: 30 G/DL (ref 32–36)
MCV RBC AUTO: 93 FL (ref 82–98)
MONOCYTES # BLD AUTO: 0.8 K/UL (ref 0.3–1)
MONOCYTES NFR BLD: 8.4 % (ref 4–15)
NEUTROPHILS # BLD AUTO: 8 K/UL (ref 1.8–7.7)
NEUTROPHILS NFR BLD: 80.2 % (ref 38–73)
NRBC BLD-RTO: 0 /100 WBC
PLATELET # BLD AUTO: 303 K/UL (ref 150–350)
PMV BLD AUTO: 9.7 FL (ref 9.2–12.9)
POTASSIUM SERPL-SCNC: 4.6 MMOL/L (ref 3.5–5.1)
RBC # BLD AUTO: 2.92 M/UL (ref 4–5.4)
SODIUM SERPL-SCNC: 135 MMOL/L (ref 136–145)
WBC # BLD AUTO: 9.99 K/UL (ref 3.9–12.7)

## 2020-03-31 PROCEDURE — 63600175 PHARM REV CODE 636 W HCPCS: Performed by: PHYSICIAN ASSISTANT

## 2020-03-31 PROCEDURE — 11000001 HC ACUTE MED/SURG PRIVATE ROOM

## 2020-03-31 PROCEDURE — 80048 BASIC METABOLIC PNL TOTAL CA: CPT

## 2020-03-31 PROCEDURE — 36415 COLL VENOUS BLD VENIPUNCTURE: CPT

## 2020-03-31 PROCEDURE — 97165 OT EVAL LOW COMPLEX 30 MIN: CPT

## 2020-03-31 PROCEDURE — 85025 COMPLETE CBC W/AUTO DIFF WBC: CPT

## 2020-03-31 PROCEDURE — 63600175 PHARM REV CODE 636 W HCPCS: Performed by: ORTHOPAEDIC SURGERY

## 2020-03-31 PROCEDURE — 25000003 PHARM REV CODE 250: Performed by: ORTHOPAEDIC SURGERY

## 2020-03-31 PROCEDURE — 97530 THERAPEUTIC ACTIVITIES: CPT

## 2020-03-31 PROCEDURE — 97161 PT EVAL LOW COMPLEX 20 MIN: CPT

## 2020-03-31 RX ORDER — SODIUM CHLORIDE 9 MG/ML
INJECTION, SOLUTION INTRAVENOUS CONTINUOUS
Status: ACTIVE | OUTPATIENT
Start: 2020-03-31 | End: 2020-03-31

## 2020-03-31 RX ADMIN — OXYCODONE 5 MG: 5 TABLET ORAL at 05:03

## 2020-03-31 RX ADMIN — POLYETHYLENE GLYCOL (3350) 17 G: 17 POWDER, FOR SOLUTION ORAL at 08:03

## 2020-03-31 RX ADMIN — MUPIROCIN: 20 OINTMENT TOPICAL at 08:03

## 2020-03-31 RX ADMIN — DOCUSATE SODIUM 100 MG: 100 CAPSULE, LIQUID FILLED ORAL at 08:03

## 2020-03-31 RX ADMIN — SODIUM CHLORIDE: 0.9 INJECTION, SOLUTION INTRAVENOUS at 02:03

## 2020-03-31 RX ADMIN — OXYCODONE 5 MG: 5 TABLET ORAL at 04:03

## 2020-03-31 RX ADMIN — DOCUSATE SODIUM 100 MG: 100 CAPSULE, LIQUID FILLED ORAL at 10:03

## 2020-03-31 RX ADMIN — MUPIROCIN: 20 OINTMENT TOPICAL at 10:03

## 2020-03-31 RX ADMIN — ACETAMINOPHEN 650 MG: 325 TABLET ORAL at 09:03

## 2020-03-31 RX ADMIN — CEFAZOLIN SODIUM 2 G: 2 SOLUTION INTRAVENOUS at 08:03

## 2020-03-31 RX ADMIN — ENOXAPARIN SODIUM 40 MG: 100 INJECTION SUBCUTANEOUS at 01:03

## 2020-03-31 RX ADMIN — CEFAZOLIN SODIUM 2 G: 2 SOLUTION INTRAVENOUS at 01:03

## 2020-03-31 NOTE — PLAN OF CARE
NADN this shift.Pt rested in bed with eyes closed.Remained  afebrile and free of falls /injury.  Checked on hourly. PRN Pain medication administered per pt request for pain.Pt up in bed watching TV. Will continue to monitor   Problem: Fall Injury Risk  Goal: Absence of Fall and Fall-Related Injury  Outcome: Ongoing, Progressing  Intervention: Identify and Manage Contributors to Fall Injury Risk  Flowsheets (Taken 3/31/2020 0614)  Self-Care Promotion: independence encouraged; BADL personal objects within reach; BADL personal routines maintained  Medication Review/Management: medications reviewed     Problem: Skin Injury Risk Increased  Goal: Skin Health and Integrity  Outcome: Ongoing, Progressing  Intervention: Optimize Skin Protection  Flowsheets (Taken 3/31/2020 0614)  Pressure Reduction Techniques: frequent weight shift encouraged; weight shift assistance provided  Pressure Reduction Devices: pressure-redistributing mattress utilized  Skin Protection: incontinence pads utilized  Head of Bed (HOB): HOB at 30-45 degrees     Problem: Infection  Goal: Infection Symptom Resolution  Outcome: Ongoing, Progressing  Intervention: Prevent or Manage Infection  Flowsheets (Taken 3/31/2020 0614)  Fever Reduction/Comfort Measures: lightweight bedding; lightweight clothing; medication administered  Isolation Precautions: protective environment maintained

## 2020-03-31 NOTE — PROGRESS NOTES
[facility]  Neurosurgery  Progress Note    Subjective:     History of Present Illness: 89 y.o. female who presents with right hip and ankle pain after sustaining a mechanical fall from standing height several days ago.  She presents to  ago. The pain is worse with any movement and relieved with rest.  Patient denies any additional injuries.  There was no loss of consciousness.  She denies having hit her head.  She presents today to clinic with her son who is her power of .  She reports that she has not been able to stand or bear any weight since her above-mentioned follow-up.  She reports and he reports as well that prior to this most recent fall she was able to stand and ambulate using her wheelchair as a walker and accomplished most of her ADLs.  She reports no relevant past medical history.  She reports a remote history of polymyalgia rheumatica and diabetes both of which she reports have been asymptomatic and resolved respectively.    Pt admitted to orthopedic surgery service. Hospital medicine consulted for clearance and medical management.    Post-Op Info:  Procedure(s) (LRB):  INSERTION, INTRAMEDULLARY ARMANDO, FEMUR-HIP (Right)   1 Day Post-Op     Interval History: POD#1 s/p right femur IM nail for hip fx. patient resting comfortably in bed, with no complaints of pain. She is tolerating a regular diet. PT will work with her this am.     Review of Systems   Denies abdominal pain  Denies N/V  Endorses cough intermittently that she associates with the dog hair in her home  She report improved motion in her right leg since surgery. Following fall, she could not move her leg or toes. Now able to move foot and toes without pain.   Objective:     Vital Signs (Most Recent):  Temp: 97.9 °F (36.6 °C) (03/31/20 0730)  Pulse: 103 (03/31/20 0730)  Resp: 18 (03/31/20 0730)  BP: (!) 103/59 (03/31/20 0730)  SpO2: 98 % (03/31/20 0730) Vital Signs (24h Range):  Temp:  [97.3 °F (36.3 °C)-98.5 °F (36.9  °C)] 97.9 °F (36.6 °C)  Pulse:  [] 103  Resp:  [12-18] 18  SpO2:  [94 %-99 %] 98 %  BP: (103-158)/(54-99) 103/59     Weight: 60.2 kg (132 lb 11.5 oz)  There is no height or weight on file to calculate BMI.    Intake/Output Summary (Last 24 hours) at 3/31/2020 1041  Last data filed at 3/31/2020 0623  Gross per 24 hour   Intake 1000 ml   Output 975 ml   Net 25 ml      Physical Exam   General: well developed, well nourished, no distress  Neurologic: Alert and oriented. Thought content appropriate.  GCS: Motor: 6/Verbal: 5/Eyes: 4 GCS Total: 15  Cranial nerves: II-XII grossly intact  Neck: supple, without obvious masses or lesions  Skin: grossly intact in all 4 extremities without obvious rashes or lesions  Abdomen: soft, non-tender, non-distended. +bowel sounds  Respiratory: non-labored breathing. Even breath sounds bilaterally. No wheezing  Motor Strength: No focal numbness or weakness. 5/5 bilateral plantar flexion. Moves BUE with good strength and tone   Sensory: intact to light touch B/L UE and LE        Significant Labs:   BMP:   Recent Labs   Lab 03/30/20  1423   *      K 3.9      CO2 26   BUN 17   CREATININE 0.7   CALCIUM 10.0     CBC:   Recent Labs   Lab 03/30/20  1423 03/31/20  0953   WBC 9.66 9.99   HGB 10.5* 8.1*   HCT 34.4* 27.0*   * 303       Significant Imaging: No new imaging for review     Assessment/Plan:     * Closed fracture of right hip  POD#1, s/p RIGHT femur IM nail for hip fracture  IS, TEDs, SCDs  Hydralazine IV PRN - if hypertensive  DVT proph and pain control- per ortho     -Stable with significant medical history requiring management   -Daily ASA can be resumed at POD#3 if H/H stable   -Hospital medicine will sign off. Please re-consult if needed. Call with any questions or concerns.       Rosie Thomas PA-C  [facility]   [facility] [place]

## 2020-03-31 NOTE — PT/OT/SLP EVAL
Occupational Therapy   Evaluation    Name: Cece Figueroa  MRN: [id number]  Admitting Diagnosis:  Closed fracture of right hip 1 Day Post-Op    Recommendations:     Discharge Recommendations: nursing facility, skilled  Discharge Equipment Recommendations:  bedside commode, hospital bed, wheelchair  Barriers to discharge:  None    Assessment:     Cece Figueroa is a 89 y.o. female with a medical diagnosis of Closed fracture of right hip.   Performance deficits affecting function: weakness, impaired self care skills, impaired functional mobilty, gait instability, impaired balance, decreased upper extremity function, decreased coordination, pain, decreased lower extremity function, decreased safety awareness, impaired skin, orthopedic precautions.    The patient required max assist for all mobility. The patient was easily distracted and required frequent redirection.    Rehab Prognosis: Fair; patient would benefit from acute skilled OT services to address these deficits and reach maximum level of function.       Plan:     Patient to be seen 6 x/week to address the above listed problems via self-care/home management, therapeutic activities, therapeutic exercises  · Plan of Care Expires: 04/14/20  · Plan of Care Reviewed with: patient    Subjective     Chief Complaint: right leg pain  Patient/Family Comments/goals: wants to go home    Occupational Profile:  Living Environment: The patient lives alone in a SS house with the assist of family who live nearby.  Previous level of function: The patient was able to transfer to a W/C ad isabel. The patient was unclear with the amount assistance received  Roles and Routines: The patient used her W/C for mobility and was able to transfer to the toilet.  Equipment Used at Home:  wheelchair  Assistance upon Discharge: 2 sons and multiple family    Pain/Comfort:  · Pain Rating 1: (right leg pain but unable to rate)  · Location - Side 1: Right  · Pain Addressed 1:  Pre-medicate for activity, Distraction, Cessation of Activity    Patients cultural, spiritual, Yazdanism conflicts given the current situation:  none    Objective:     Communicated with: nurse prior to session.  Patient found HOB elevated with bed alarm, SCD, peripheral IV upon OT entry to room.    General Precautions: Standard, fall, hearing impaired   Orthopedic Precautions:RLE weight bearing as tolerated   Braces: N/A     Occupational Performance:    Bed Mobility:    · Patient completed Rolling/Turning to Left with  maximal assistance  · Patient completed Scooting/Bridging with maximal assistance  · Patient completed Supine to Sit with maximal assistance  · Patient completed Sit to Supine with maximal assistance    Functional Mobility/Transfers:  · Patient completed Sit <> Stand Transfer with maximal assistance  with  hand-held assist   · Functional Mobility: The patient tolerated sitting on the EOB `10 min with SBA.    Activities of Daily Living:  · Upper Body Dressing: maximal assistance to don back gown  · Lower Body Dressing: dependence      Cognitive/Visual Perceptual:  Cognitive/Psychosocial Skills:     -       Oriented to: Person, Place and Situation   -       Follows Commands/attention:Follows one-step commands  -       Communication: clear/fluent  -       Memory: memory appears to be intact but the patient is easily distracted  and required redirection to give a functional history  -       Safety awareness/insight to disability: impaired   -       Mood/Affect/Coping skills/emotional control: Appropriate to situation    Physical Exam:  Balance: -       fair sitting  Postural examination/scapula alignment:    -       Rounded shoulders  -       Forward head  -       Kyphosis  Skin integrity: right leg incision covered by dressing  Upper Extremity Range of Motion:     -       Right Upper Extremity: WFL  -       Left Upper Extremity: WFL  Upper Extremity Strength:    -       Right Upper Extremity: WFL  -        Left Upper Extremity: WFL   Strength:    -       Right Upper Extremity: WFL  -       Left Upper Extremity: WFL    AMPAC 6 Click ADL:  AMPAC Total Score: 14    Treatment & Education:  The patient participated in OT eval and was educated re: OT role and POC.   Education:    Patient left HOB elevated with all lines intact, call button in reach, bed alarm on and nurseJossy notified    GOALS:   Multidisciplinary Problems     Occupational Therapy Goals        Problem: Occupational Therapy Goal    Goal Priority Disciplines Outcome Interventions   Occupational Therapy Goal     OT, PT/OT     Description:  Goals to be met by: 4/14/20    Patient will increase functional independence with ADLs by performing:    UE Dressing with Contact Guard Assistance.  Grooming while seated with Modified Gregory and Set-up Assistance.  Sitting at edge of bed x15 minutes with Stand-by Assistance.  Rolling to Bilateral with Minimal Assistance.   Stand pivot transfers with Moderate Assistance.  Upper extremity exercise program x10 reps per handout, with assistance as needed.  Educate the patient re: Home Safety                      History:     History reviewed. No pertinent past medical history.    Past Surgical History:   Procedure Laterality Date    INTRAMEDULLARY RODDING OF FEMUR Right 3/30/2020    Procedure: INSERTION, INTRAMEDULLARY ARMANDO, FEMUR-HIP;  Surgeon: Armen Norwood MD;  [facility]: [facility];  Service: Orthopedics;  Laterality: Right;  JALEESA       Time Tracking:     OT Date of Treatment: 03/31/20  OT Start Time: 1027  OT Stop Time: 1049  OT Total Time (min): 22 min    Billable Minutes:Evaluation 22 (with PT)    Germania Sanchez OT  3/31/2020

## 2020-03-31 NOTE — NURSING
10 cc balloon  deflated. 16 Fr Cabrera catheter  Pulled with no resistance noted. Catheter tip intact. 500 cc of yellow clear urine noted in the drainage bag. Pt tolerated procedure well. Incontinent brief applied. Will continue to monitor

## 2020-03-31 NOTE — PLAN OF CARE
03/31/20 1231   Discharge Assessment   Assessment Type Discharge Planning Assessment   Assessment information obtained from? Patient   Prior to hospitilization cognitive status: Alert/Oriented   Prior to hospitalization functional status: Assistive Equipment   Current cognitive status: Alert/Oriented   Current Functional Status: Assistive Equipment   Facility Arrived From: Home    Lives With alone  (According to pt, family close around. )   Able to Return to Prior Arrangements yes   Is patient able to care for self after discharge? Unable to determine at this time (comments)   Who are your caregiver(s) and their phone number(s)? Samaria, pt's son, [phone]    Patient's perception of discharge disposition home health   Readmission Within the Last 30 Days no previous admission in last 30 days   Patient currently being followed by outpatient case management? No   Patient currently receives any other outside agency services? No   Equipment Currently Used at Home wheelchair  (According to pt, wheelchair was given to her by someone else. )   Do you have any problems affording any of your prescribed medications? No   Is the patient taking medications as prescribed? yes   Does the patient have transportation home? Yes   Transportation Anticipated family or friend will provide   Dialysis Name and Scheduled days N/A    Does the patient receive services at the [facility]? No   Discharge Plan A Home with family;Home Health   Discharge Plan B Other  (TBD )   DME Needed Upon Discharge  other (see comments)  (TBD )   Patient/Family in Agreement with Plan yes

## 2020-03-31 NOTE — PLAN OF CARE
Problem: Physical Therapy Goal  Goal: Physical Therapy Goal  Description  Goals to be met by: 2020    Patient will increase functional independence with mobility by performin. Sit<>stand with min with no AD.  2. Pt to transfer spine<>sit with min A  3. Pt to transfer EOB to W/C with min A, no AD    3/31/2020 1331 by Marvin Leo, PT  Outcome: Ongoing, Progressing  3/31/2020 1105 by Marvin Leo, PT  Outcome: Ongoing, Progressing   Pt presented to supine, cooperative with PT. Bed mobility supine<.sit with max A;2 staff.  Scoot to EOB with mod  A to place feet on floor.  Pt sat EOB X ~5 min with SPV.  Sit>stand to PT with max A, pt not taking wt on R LE. Staitc standing < 1 min with max A.  To sit with CGA, to supine with max A; 2 staff.  Pt requires additional time  and frequent redirection to stay on tasks

## 2020-03-31 NOTE — NURSING
1330 -- Dr Norwood notified via secure chat of pt unable to urinate s/p cath removal at 6am today and bladder scan done and only showed 180 cc .  Awaiting return response    1400 --  DANNIELLE Velez notified of above issue and awaiting orders to start IV fluids and bladder scan Q6  Pt encouraged to drink fluids     1720  -- DANNIELLE Velez notified of pt still unable to void and baldder scan done again with 327ml found.  Order obtained to give 500cc bolus and wait 2 hours.  If no void then in and out cath and leave ramos if greater than 400cc.. No further orders.. Will monitor      1830-- Bolus of 500 NS finished at this time.  Will give pt 2 hours and then in/out cath per order      1905- pt reports feeling better with boot off her leg and refusing to have it put it back on. Leg elevated and scd's readjusted.    Report given to Leyda and pt reminded to call for assistance if she needed. Call light in reach .. Will monitor

## 2020-03-31 NOTE — PLAN OF CARE
03/31/20 1420   Post-Acute Status   Post-Acute Authorization Home Health   Home Health Status Awaiting Internal Medical Clearance   Patient choice form signed by patient/caregiver   (Pt would like to resume with [facility]. )   Discharge Delays None known at this time   Discharge Plan   Discharge Plan A Home with family;Home Health     SW contacted pt's son, Samaria, to discuss d/c planning. KENTON explained, PT/OT recommending SNF, however, Samaria would like to take pt home due to virus. Per Samaria, he would like for pt to resume with [facility].

## 2020-03-31 NOTE — ASSESSMENT & PLAN NOTE
POD#1, s/p RIGHT femur IM nail for hip fracture  -No significant medical history   -Daily ASA can be resumed at POD#3 if H/H stable     IS, TEDs, SCDs  Hydralazine IV PRN - if hypertensive  DVT proph and pain control- per ortho

## 2020-03-31 NOTE — TELEPHONE ENCOUNTER
----- Message from Sabrina Ortez sent at 3/30/2020  1:22 PM CDT -----  Dr. Ponce looked at the patient's x ray and sent her to the ER. Thanks.

## 2020-03-31 NOTE — PLAN OF CARE
Pt presented to supine, cooperative with PT. Bed mobility supine<.sit with max A;2 staff.  Scoot to EOB with mod  A to place feet on floor.  Pt sat EOB X ~5 min with SPV.  Sit>stand to PT with max A, pt not taking wt on R LE. Staitc standing < 1 min with max A.  To sit with CGA, to supine with max A; 2 staff.  Pt requires additional time  and frequent redirection to stay on tasks

## 2020-03-31 NOTE — NURSING
Pt arrived to floor sleepy but easily arousable. Denies pain at the moment. Three aquacels to L hip noted, c/d/i. Iv fluids infusing. Travis hose/scds on.

## 2020-03-31 NOTE — PT/OT/SLP EVAL
Physical Therapy Evaluation    Patient Name:  Cece Figueroa   MRN:  [id number]    Recommendations:     Discharge Recommendations:  nursing facility, skilled   Discharge Equipment Recommendations: hospital bed, wheelchair(pt indicated that thes were already ordered)   Barriers to discharge: None    Assessment:     Cece Figueroa is a 89 y.o. female admitted with a medical diagnosis of Closed fracture of right hip.  She presents with the following impairments/functional limitations:  weakness, impaired endurance, impaired functional mobilty, impaired self care skills, gait instability, impaired balance, impaired cognition, decreased coordination, decreased lower extremity function, pain, decreased safety awareness, decreased ROM, impaired skin, impaired joint extensibility, orthopedic precautions . Pt  with functional mobility deficits and should benefit from  PT  to maximize I and safety decrease risk of further decline of injury.    Rehab Prognosis: Good; patient would benefit from acute skilled PT services to address these deficits and reach maximum level of function.    Recent Surgery: Procedure(s) (LRB):  INSERTION, INTRAMEDULLARY ARMANDO, FEMUR-HIP (Right) 1 Day Post-Op    Plan:     During this hospitalization, patient to be seen 5 x/week to address the identified rehab impairments via gait training, therapeutic activities, therapeutic exercises, neuromuscular re-education and progress toward the following goals:    · Plan of Care Expires:  04/30/20    Subjective     Chief Complaint: R LE pain  Patient/Family Comments/goals: none stated  Pain/Comfort:  · Pain Rating 1: (pt c/o R LE pain with movement, did not use scale)  · Location - Side 1: Right  · Location - Orientation 1: generalized  · Location 1: hip  · Pain Addressed 1: Pre-medicate for activity, Reposition, Cessation of Activity  · Pain Rating Post-Intervention 1: (pt without c/o of pain)    Patients cultural, spiritual, Mormon  conflicts given the current situation: no    Living Environment:  Pt lives alone in [facility], ramp entrance.  Has children that live next door and on same block  Prior to admission,pt is nonambulatory patients level of function per pt mod I from w/c level. performing transfers mod I, no AD  Equipment used at home: wheelchair.  DME owned (not currently used): none.  Upon discharge, patient will have assistance from family.    Objective:     Communicated with nursing prior to session.  Patient found supine with peripheral IV, SCD  upon PT entry to room.    General Precautions: Standard, fall, hearing impaired   Orthopedic Precautions:RLE weight bearing as tolerated   Braces: N/A     Exams:  · Cognitive Exam:  Patient is oriented to Person, Place and Situation  · Postural Exam:  Patient presented with the following abnormalities:    · -       Rounded shoulders  · -       Forward head  · -       Kyphosis  · Sensation:    · -       Intact  · RLE ROM: WFL  · RLE Strength: knee and ankle grossly WFL, post sx hip NT   · LLE ROM: WFL  · LLE Strength: WFL    Functional Mobility:  · Bed Mobility:     · Supine to Sit: maximal assistance  · Sit to Supine: maximal assistance  · Transfers:     · Sit to Stand:  maximal assistance with no AD  · Gait: pt is nonambulatory  · Balance: sat EOB x ~ 5min with SPV, standing poor      Therapeutic Activities and Exercises:  Pt presented to supine, cooperative with PT. Bed mobility supine<.sit with max A;2 staff.  Scoot to EOB with mod  A to place feet on floor.  Pt sat EOB X ~5 min with SPV.  Sit>stand to PT with max A, pt not taking wt on R LE. Staitc standing < 1 min with max A.  To sit with CGA, to supine with max A; 2 staff.  Pt requires additional time  and frequent redirection to stay on tasks    AM-PAC 6 CLICK MOBILITY  Total Score:10     Patient left supine with all lines intact, call button in reach and nurse notified.    GOALS:   Multidisciplinary Problems     Physical Therapy Goals         Problem: Physical Therapy Goal    Goal Priority Disciplines Outcome Goal Variances Interventions   Physical Therapy Goal     PT, PT/OT Ongoing, Progressing     Description:  Goals to be met by: 2020    Patient will increase functional independence with mobility by performin. Sit<>stand with min with no AD.  2. Pt to transfer spine<>sit with min A  3. Pt to transfer EOB to W/C with min A, no AD                     History:     History reviewed. No pertinent past medical history.    Past Surgical History:   Procedure Laterality Date    INTRAMEDULLARY RODDING OF FEMUR Right 3/30/2020    Procedure: INSERTION, INTRAMEDULLARY ARMANDO, FEMUR-HIP;  Surgeon: Armen Norwood MD;  Location: [facility];  Service: Orthopedics;  Laterality: Right;  JALEESA       Time Tracking:     PT Received On: 20  PT Start Time: 1026     PT Stop Time: 1049  PT Total Time (min): 23 min     Billable Minutes: Evaluation 15 and Therapeutic Activity 8      Marvin Leo, PT  2020

## 2020-03-31 NOTE — PROGRESS NOTES
OrthopedicPostop Progress Note    Postop day: 1    ID: The patient is a 89 y.o. female status post: RIGHT femur IM nail for hip fracture    Overnight Events: NAEON per nurse, ample UOP, pain controlled    Vitals:    03/31/20 0730   BP: (!) 103/59   Pulse: 103   Resp: 18   Temp: 97.9 °F (36.6 °C)       Drain Output  03/30 0701 - 03/31 0700  In: 1000   Out: 975     Physical Exam:  NAD, A/O  fully.  Wound c/d/i with clean dressing.  No focal motor or sensory deficits noted.    Assessment: The patient is a 89 y.o. female status post: RIGHT hip IM nail doing well    Plan:  1) Antibiotics: post op ancef x 24 hrs  2) Weight bearing status: WBAT with PT, will give pt a boot today for her non-displaced lateral malleolus ankle fracture  3) Labs: AM labs pending  4) DVT Prophylaxis: lovenox daily  5) Lines/Drains: PIV  6) Dispo: home with home health tomorrow or Thursday, discussed with son SamariaLee Norwood

## 2020-03-31 NOTE — SUBJECTIVE & OBJECTIVE
Interval History: POD#1 s/p right femur IM nail for hip fx. patient resting comfortably in bed, with no complaints of pain. She is tolerating a regular diet. PT will work with her this am.     Review of Systems   Denies abdominal pain  Denies N/V  Endorses cough intermittently that she associates with the dog hair in her home  She report improved motion in her right leg since surgery. Following fall, she could not move her leg or toes. Now able to move foot and toes without pain.   Objective:     Vital Signs (Most Recent):  Temp: 97.9 °F (36.6 °C) (03/31/20 0730)  Pulse: 103 (03/31/20 0730)  Resp: 18 (03/31/20 0730)  BP: (!) 103/59 (03/31/20 0730)  SpO2: 98 % (03/31/20 0730) Vital Signs (24h Range):  Temp:  [97.3 °F (36.3 °C)-98.5 °F (36.9 °C)] 97.9 °F (36.6 °C)  Pulse:  [] 103  Resp:  [12-18] 18  SpO2:  [94 %-99 %] 98 %  BP: (103-158)/(54-99) 103/59     Weight: 60.2 kg (132 lb 11.5 oz)  There is no height or weight on file to calculate BMI.    Intake/Output Summary (Last 24 hours) at 3/31/2020 1041  Last data filed at 3/31/2020 0623  Gross per 24 hour   Intake 1000 ml   Output 975 ml   Net 25 ml      Physical Exam   General: well developed, well nourished, no distress  Neurologic: Alert and oriented. Thought content appropriate.  GCS: Motor: 6/Verbal: 5/Eyes: 4 GCS Total: 15  Cranial nerves: II-XII grossly intact  Neck: supple, without obvious masses or lesions  Skin: grossly intact in all 4 extremities without obvious rashes or lesions  Abdomen: soft, non-tender, non-distended. +bowel sounds  Respiratory: non-labored breathing. Even breath sounds bilaterally. No wheezing  Motor Strength: No focal numbness or weakness. 5/5 bilateral plantar flexion. Moves BUE with good strength and tone   Sensory: intact to light touch B/L UE and LE        Significant Labs:   BMP:   Recent Labs   Lab 03/30/20  1423   *      K 3.9      CO2 26   BUN 17   CREATININE 0.7   CALCIUM 10.0     CBC:   Recent Labs    Lab 03/30/20  1423 03/31/20  0953   WBC 9.66 9.99   HGB 10.5* 8.1*   HCT 34.4* 27.0*   * 303       Significant Imaging: No new imaging for review

## 2020-04-01 LAB
ANION GAP SERPL CALC-SCNC: 5 MMOL/L (ref 8–16)
BASOPHILS # BLD AUTO: 0.02 K/UL (ref 0–0.2)
BASOPHILS NFR BLD: 0.2 % (ref 0–1.9)
BLD PROD TYP BPU: NORMAL
BLOOD UNIT EXPIRATION DATE: NORMAL
BLOOD UNIT TYPE CODE: 5100
BLOOD UNIT TYPE: NORMAL
BUN SERPL-MCNC: 16 MG/DL (ref 10–30)
CALCIUM SERPL-MCNC: 9.7 MG/DL (ref 8.7–10.5)
CHLORIDE SERPL-SCNC: 102 MMOL/L (ref 95–110)
CO2 SERPL-SCNC: 29 MMOL/L (ref 23–29)
CODING SYSTEM: NORMAL
CREAT SERPL-MCNC: 0.8 MG/DL (ref 0.5–1.4)
DIFFERENTIAL METHOD: ABNORMAL
DISPENSE STATUS: NORMAL
EOSINOPHIL # BLD AUTO: 0.1 K/UL (ref 0–0.5)
EOSINOPHIL NFR BLD: 0.9 % (ref 0–8)
ERYTHROCYTE [DISTWIDTH] IN BLOOD BY AUTOMATED COUNT: 13.7 % (ref 11.5–14.5)
EST. GFR  (AFRICAN AMERICAN): >60 ML/MIN/1.73 M^2
EST. GFR  (NON AFRICAN AMERICAN): >60 ML/MIN/1.73 M^2
GLUCOSE SERPL-MCNC: 111 MG/DL (ref 70–110)
HCT VFR BLD AUTO: 24.8 % (ref 37–48.5)
HGB BLD-MCNC: 7.3 G/DL (ref 12–16)
IMM GRANULOCYTES # BLD AUTO: 0.08 K/UL (ref 0–0.04)
IMM GRANULOCYTES NFR BLD AUTO: 0.8 % (ref 0–0.5)
LYMPHOCYTES # BLD AUTO: 0.9 K/UL (ref 1–4.8)
LYMPHOCYTES NFR BLD: 8.8 % (ref 18–48)
MCH RBC QN AUTO: 27.9 PG (ref 27–31)
MCHC RBC AUTO-ENTMCNC: 29.4 G/DL (ref 32–36)
MCV RBC AUTO: 95 FL (ref 82–98)
MONOCYTES # BLD AUTO: 1 K/UL (ref 0.3–1)
MONOCYTES NFR BLD: 9.1 % (ref 4–15)
NEUTROPHILS # BLD AUTO: 8.5 K/UL (ref 1.8–7.7)
NEUTROPHILS NFR BLD: 80.2 % (ref 38–73)
NRBC BLD-RTO: 0 /100 WBC
PLATELET # BLD AUTO: 261 K/UL (ref 150–350)
PMV BLD AUTO: 10 FL (ref 9.2–12.9)
POTASSIUM SERPL-SCNC: 5.4 MMOL/L (ref 3.5–5.1)
RBC # BLD AUTO: 2.62 M/UL (ref 4–5.4)
SODIUM SERPL-SCNC: 136 MMOL/L (ref 136–145)
TRANS ERYTHROCYTES VOL PATIENT: NORMAL ML
WBC # BLD AUTO: 10.57 K/UL (ref 3.9–12.7)

## 2020-04-01 PROCEDURE — 25000003 PHARM REV CODE 250: Performed by: ORTHOPAEDIC SURGERY

## 2020-04-01 PROCEDURE — P9021 RED BLOOD CELLS UNIT: HCPCS

## 2020-04-01 PROCEDURE — 80048 BASIC METABOLIC PNL TOTAL CA: CPT

## 2020-04-01 PROCEDURE — 97530 THERAPEUTIC ACTIVITIES: CPT

## 2020-04-01 PROCEDURE — 36430 TRANSFUSION BLD/BLD COMPNT: CPT

## 2020-04-01 PROCEDURE — 11000001 HC ACUTE MED/SURG PRIVATE ROOM

## 2020-04-01 PROCEDURE — 85025 COMPLETE CBC W/AUTO DIFF WBC: CPT

## 2020-04-01 PROCEDURE — 97110 THERAPEUTIC EXERCISES: CPT

## 2020-04-01 PROCEDURE — 97535 SELF CARE MNGMENT TRAINING: CPT

## 2020-04-01 PROCEDURE — 63600175 PHARM REV CODE 636 W HCPCS: Performed by: ORTHOPAEDIC SURGERY

## 2020-04-01 PROCEDURE — 36415 COLL VENOUS BLD VENIPUNCTURE: CPT

## 2020-04-01 RX ORDER — ALBUTEROL SULFATE 90 UG/1
2 AEROSOL, METERED RESPIRATORY (INHALATION) EVERY 4 HOURS PRN
Status: DISCONTINUED | OUTPATIENT
Start: 2020-04-01 | End: 2020-04-02 | Stop reason: HOSPADM

## 2020-04-01 RX ORDER — HYDROCODONE BITARTRATE AND ACETAMINOPHEN 500; 5 MG/1; MG/1
TABLET ORAL
Status: DISCONTINUED | OUTPATIENT
Start: 2020-04-01 | End: 2020-04-02 | Stop reason: HOSPADM

## 2020-04-01 RX ADMIN — POLYETHYLENE GLYCOL (3350) 17 G: 17 POWDER, FOR SOLUTION ORAL at 08:04

## 2020-04-01 RX ADMIN — MUPIROCIN 1 G: 20 OINTMENT TOPICAL at 08:04

## 2020-04-01 RX ADMIN — DOCUSATE SODIUM 100 MG: 100 CAPSULE, LIQUID FILLED ORAL at 08:04

## 2020-04-01 RX ADMIN — OXYCODONE 5 MG: 5 TABLET ORAL at 09:04

## 2020-04-01 RX ADMIN — MUPIROCIN: 20 OINTMENT TOPICAL at 08:04

## 2020-04-01 RX ADMIN — ACETAMINOPHEN 650 MG: 325 TABLET ORAL at 11:04

## 2020-04-01 RX ADMIN — ENOXAPARIN SODIUM 40 MG: 100 INJECTION SUBCUTANEOUS at 01:04

## 2020-04-01 RX ADMIN — OXYCODONE 5 MG: 5 TABLET ORAL at 08:04

## 2020-04-01 RX ADMIN — ACETAMINOPHEN 650 MG: 325 TABLET ORAL at 01:04

## 2020-04-01 NOTE — PLAN OF CARE
Problem: Physical Therapy Goal  Goal: Physical Therapy Goal  Description  Goals to be met by: 2020    Patient will increase functional independence with mobility by performin. Sit<>stand with min with no AD.  2. Pt to transfer spine<>sit with min A  3. Pt to transfer EOB to W/C with min A, no AD    Outcome: Ongoing, Progressing    Home with family assist for mobility and HHPT vs SNF. Cont daily

## 2020-04-01 NOTE — NURSING
2230 Bladder scanned, 461 cc.    2330 In and out catheterization done using sterile technique. 350 cc mild odor, imelda urine noted. No s/s of distress noted after procedure. No complaints of  Itching. No skin breakdown or discharge noted. Will continue to monitor.    Due to void spontaneously.

## 2020-04-01 NOTE — PLAN OF CARE
Problem: Occupational Therapy Goal  Goal: Occupational Therapy Goal  Description  Goals to be met by: 4/14/20    Patient will increase functional independence with ADLs by performing:    UE Dressing with Contact Guard Assistance.  Grooming while seated with Modified [place] and Set-up Assistance.  Sitting at edge of bed x15 minutes with Stand-by Assistance.  Rolling to Bilateral with Minimal Assistance.   Stand pivot transfers with Moderate Assistance.  Upper extremity exercise program x10 reps per handout, with assistance as needed.  Educate the patient re: Home Safety     Outcome: Ongoing, Progressing   The patient performed in grooming tasks and AROM to BUE while seated on the EOB. The patient with posterior LOB while engaged in tasks.  The patient states she wants to go home with the assistance of her family. Patient will benefit from OT to address functional deficits.

## 2020-04-01 NOTE — PROGRESS NOTES
POD#2  Patient stated her pain is well controlled and she is comfortable. PT recommending SNF but she is hoping to go home with HH. Dr Norwood has spoken to her family about this.    VSSAF    Right hip: Surgical dsg c/d/i. NVI distally.  Hgb: 7.3    A/P: POD#2 IM nail right IT femur fx. Right lateral malleolus fx  1) up with PT- WBAT in the walking boot  2) d/c planning- would like to go home with HH this week

## 2020-04-01 NOTE — PT/OT/SLP PROGRESS
Occupational Therapy   Treatment    Name: Cece Figueroa  MRN: [id number]  Admitting Diagnosis:  Closed fracture of right hip  2 Days Post-Op    Recommendations:     Discharge Recommendations: nursing facility, skilled  Discharge Equipment Recommendations:  hospital bed, wheelchair  Barriers to discharge:  None    Assessment:     Cece Figueroa is a 89 y.o. female with a medical diagnosis of Closed fracture of right hip.   Performance deficits affecting function are weakness, impaired endurance, impaired self care skills, impaired functional mobilty, impaired balance, decreased lower extremity function, decreased upper extremity function, pain, decreased safety awareness, decreased coordination, impaired skin, orthopedic precautions.    The patient performed in grooming tasks and AROM to BUE while seated on the EOB. The patient with posterior LOB while engaged in tasks.  The patient states she wants to go home with the assistance of her family. The patient will require 24* care if D/C to home.    Rehab Prognosis:  Fair; patient would benefit from acute skilled OT services to address these deficits and reach maximum level of function.       Plan:     Patient to be seen 5 x/week, 6 x/week to address the above listed problems via self-care/home management, therapeutic exercises, therapeutic activities  · Plan of Care Expires: 04/14/20  · Plan of Care Reviewed with: patient    Subjective     Pain/Comfort:  · Pain Rating 1: (monimal c/o pain)  · Location - Side 1: Right  · Location 1: leg  · Pain Addressed 1: Pre-medicate for activity, Nurse notified, Cessation of Activity, Reposition    Objective:     Communicated with: Neda strange prior to session.  Patient found seated on the EOB with peripheral IV, oxygen upon OT entry to room.    General Precautions: Standard, fall   Orthopedic Precautions:RLE weight bearing as tolerated   Braces: N/A     Occupational Performance:     Bed Mobility:    · Patient  completed Scooting/Bridging with dependent and to scoot along the EOB from sitting, The patient was able to scoot with from supine with CGA and verbal cues  with the [facility] and use of the bedrail  · Patient completed Sit to Supine with maximal assistance and dependent     Functional Mobility/Transfers:  · Functional Mobility: The patient participated in seated grooming and UE ROM.    Activities of Daily Living:  · Feeding:  modified independence to drink from a cup while seated on the EOB  · Grooming: stand by assistance to wipe hands and face      [facility] 6 Click ADL: 14    Treatment & Education:  · Pt completed  x 10 BUE AROM in seated EOB position:  · Digits I-V flexion/extension   · Wrist flexion/extension  · Forearm pronation/supination  · Elbow flexion/extension  · Shoulder flexion/extension  · Shoulder elevation/depression   The patient with posterior LOB with when not supported with bed rail with one hand.    Patient left HOB elevated with all lines intact, call button in reach, bed alarm on and nurse, Neda notifiedEducation:      GOALS:   Multidisciplinary Problems     Occupational Therapy Goals        Problem: Occupational Therapy Goal    Goal Priority Disciplines Outcome Interventions   Occupational Therapy Goal     OT, PT/OT Ongoing, Progressing    Description:  Goals to be met by: 4/14/20    Patient will increase functional independence with ADLs by performing:    UE Dressing with Contact Guard Assistance.  Grooming while seated with Modified [place] and Set-up Assistance.  Sitting at edge of bed x15 minutes with Stand-by Assistance.  Rolling to Bilateral with Minimal Assistance.   Stand pivot transfers with Moderate Assistance.  Upper extremity exercise program x10 reps per handout, with assistance as needed.  Educate the patient re: Home Safety                      Time Tracking:     OT Date of Treatment: 04/01/20  OT Start Time: 1017  OT Stop Time: 1045  OT Total Time (min): 28  min    Billable Minutes:Self Care/Home Management 14  Therapeutic Exercise 14  Total Time 28    Germania Sanchez OT  4/1/2020

## 2020-04-01 NOTE — PLAN OF CARE
04/01/20 1328   Post-Acute Status   Post-Acute Authorization Home Health   Home Health Status Awaiting Orders for HH   Patient choice form signed by patient/caregiver List with quality metrics by geographic area provided   Discharge Delays None known at this time   Discharge Plan   Discharge Plan A Home with family;Home Health     KENTON contacted Elizabeth with [facility] to get a update on pt's equipment. According to Elizabeth, pt has been approved for equipment w/c, hospital bed, and bsc. KENTON spoke with pt's daughter, Mckayla, and explained pt has been approved for equipment, and KENTON will arrange HH when medical stable.

## 2020-04-01 NOTE — PT/OT/SLP PROGRESS
Physical Therapy Treatment    Patient Name:  Cece Figueroa   MRN:  [id number]    Recommendations:     Discharge Recommendations:  nursing facility, skilled   Discharge Equipment Recommendations: none   Barriers to discharge:  none    Assessment:     Cece Figueroa is a 89 y.o. female admitted with a medical diagnosis of Closed fracture of right hip.  She presents with the following impairments/functional limitations:  weakness, impaired self care skills, impaired balance, decreased ROM, impaired joint extensibility, impaired endurance, impaired functional mobilty, pain, edema, decreased lower extremity function, impaired cardiopulmonary response to activity, orthopedic precautions .    Rehab Prognosis: Fair; patient would benefit from acute skilled PT services to address these deficits and reach maximum level of function.    Recent Surgery: Procedure(s) (LRB):  INSERTION, INTRAMEDULLARY ARMANDO, FEMUR-HIP (Right) 2 Days Post-Op    Plan:     During this hospitalization, patient to be seen daily to address the identified rehab impairments via gait training, therapeutic activities, therapeutic exercises, neuromuscular re-education, wheelchair management/training and progress toward the following goals:    · Plan of Care Expires:  04/15/20    Subjective     Chief Complaint: hip pain  Patient/Family Comments/goals: agreed to sit EOB without encouragement  Pain/Comfort:  · Pain Rating 1: 10/10  · Location - Side 1: Left  · Location 1: hip  · Pain Addressed 1: Reposition, Nurse notified      Objective:     Communicated with RN prior to session.  Patient found HOB elevated with peripheral IV, oxygen, SCD upon PT entry to room.     General Precautions: Standard, fall   Orthopedic Precautions:RLE weight bearing as tolerated   Braces: (CAM boot for R ankle)     Functional Mobility:  · Bed Mobility:     · Supine to Sit: dependence  · Balance: good static seated unsupported at EOB     Pt declined to attempt to  stand despite encouragement  Pt very chatty; excellent historian.   Sat EOB x 15 min talking about her family, brushing her hair with comb.  O2 sats on RA 92% seated EOB    AM-PAC 6 CLICK MOBILITY  Turning over in bed (including adjusting bedclothes, sheets and blankets)?: 3  Sitting down on and standing up from a chair with arms (e.g., wheelchair, bedside commode, etc.): 1  Moving from lying on back to sitting on the side of the bed?: 2  Moving to and from a bed to a chair (including a wheelchair)?: 1  Need to walk in hospital room?: 1  Climbing 3-5 steps with a railing?: 1  Basic Mobility Total Score: 9       Therapeutic Activities and Exercises:   performed APs, LAQ, HS x 20 reps each    Patient left seated EOB with OT present with OT present..    GOALS:   Multidisciplinary Problems     Physical Therapy Goals        Problem: Physical Therapy Goal    Goal Priority Disciplines Outcome Goal Variances Interventions   Physical Therapy Goal     PT, PT/OT Ongoing, Progressing     Description:  Goals to be met by: 2020    Patient will increase functional independence with mobility by performin. Sit<>stand with min with no AD.  2. Pt to transfer spine<>sit with min A  3. Pt to transfer EOB to W/C with min A, no AD                     Time Tracking:     PT Received On: 20  PT Start Time: 939     PT Stop Time: 1009  PT Total Time (min): 30 min     Billable Minutes: Therapeutic Activity 15 and Therapeutic Exercise 15    Treatment Type: Treatment  PT/PTA: PT     PTA Visit Number: 0     Swetha Whitehead, PT  2020

## 2020-04-02 ENCOUNTER — TELEPHONE (OUTPATIENT)
Dept: FAMILY MEDICINE | Facility: CLINIC | Age: 85
End: 2020-04-02

## 2020-04-02 VITALS
OXYGEN SATURATION: 91 % | WEIGHT: 132.69 LBS | SYSTOLIC BLOOD PRESSURE: 123 MMHG | HEART RATE: 89 BPM | TEMPERATURE: 98 F | DIASTOLIC BLOOD PRESSURE: 60 MMHG | RESPIRATION RATE: 18 BRPM

## 2020-04-02 LAB
ANION GAP SERPL CALC-SCNC: 4 MMOL/L (ref 8–16)
BASOPHILS # BLD AUTO: 0.03 K/UL (ref 0–0.2)
BASOPHILS NFR BLD: 0.2 % (ref 0–1.9)
BUN SERPL-MCNC: 16 MG/DL (ref 10–30)
CALCIUM SERPL-MCNC: 10.1 MG/DL (ref 8.7–10.5)
CHLORIDE SERPL-SCNC: 100 MMOL/L (ref 95–110)
CO2 SERPL-SCNC: 30 MMOL/L (ref 23–29)
CREAT SERPL-MCNC: 0.7 MG/DL (ref 0.5–1.4)
DIFFERENTIAL METHOD: ABNORMAL
EOSINOPHIL # BLD AUTO: 0.2 K/UL (ref 0–0.5)
EOSINOPHIL NFR BLD: 1.1 % (ref 0–8)
ERYTHROCYTE [DISTWIDTH] IN BLOOD BY AUTOMATED COUNT: 19.8 % (ref 11.5–14.5)
EST. GFR  (AFRICAN AMERICAN): >60 ML/MIN/1.73 M^2
EST. GFR  (NON AFRICAN AMERICAN): >60 ML/MIN/1.73 M^2
GLUCOSE SERPL-MCNC: 121 MG/DL (ref 70–110)
HCT VFR BLD AUTO: 28.7 % (ref 37–48.5)
HGB BLD-MCNC: 8.7 G/DL (ref 12–16)
IMM GRANULOCYTES # BLD AUTO: 0.11 K/UL (ref 0–0.04)
IMM GRANULOCYTES NFR BLD AUTO: 0.7 % (ref 0–0.5)
LYMPHOCYTES # BLD AUTO: 1.2 K/UL (ref 1–4.8)
LYMPHOCYTES NFR BLD: 8.1 % (ref 18–48)
MCH RBC QN AUTO: 26.4 PG (ref 27–31)
MCHC RBC AUTO-ENTMCNC: 30.3 G/DL (ref 32–36)
MCV RBC AUTO: 87 FL (ref 82–98)
MONOCYTES # BLD AUTO: 1 K/UL (ref 0.3–1)
MONOCYTES NFR BLD: 6.5 % (ref 4–15)
NEUTROPHILS # BLD AUTO: 12.6 K/UL (ref 1.8–7.7)
NEUTROPHILS NFR BLD: 83.4 % (ref 38–73)
NRBC BLD-RTO: 0 /100 WBC
PLATELET # BLD AUTO: 287 K/UL (ref 150–350)
PMV BLD AUTO: 9.5 FL (ref 9.2–12.9)
POTASSIUM SERPL-SCNC: 5 MMOL/L (ref 3.5–5.1)
RBC # BLD AUTO: 3.3 M/UL (ref 4–5.4)
SODIUM SERPL-SCNC: 134 MMOL/L (ref 136–145)
WBC # BLD AUTO: 15.17 K/UL (ref 3.9–12.7)

## 2020-04-02 PROCEDURE — 63600175 PHARM REV CODE 636 W HCPCS: Performed by: ORTHOPAEDIC SURGERY

## 2020-04-02 PROCEDURE — 25000242 PHARM REV CODE 250 ALT 637 W/ HCPCS: Performed by: ORTHOPAEDIC SURGERY

## 2020-04-02 PROCEDURE — 25000003 PHARM REV CODE 250: Performed by: ORTHOPAEDIC SURGERY

## 2020-04-02 PROCEDURE — 36415 COLL VENOUS BLD VENIPUNCTURE: CPT

## 2020-04-02 PROCEDURE — 85025 COMPLETE CBC W/AUTO DIFF WBC: CPT

## 2020-04-02 PROCEDURE — 80048 BASIC METABOLIC PNL TOTAL CA: CPT

## 2020-04-02 RX ORDER — HYDROCODONE BITARTRATE AND ACETAMINOPHEN 10; 325 MG/1; MG/1
1 TABLET ORAL EVERY 6 HOURS PRN
Qty: 51 TABLET | Refills: 0 | Status: ON HOLD | OUTPATIENT
Start: 2020-04-02 | End: 2020-05-22 | Stop reason: HOSPADM

## 2020-04-02 RX ORDER — ENOXAPARIN SODIUM 100 MG/ML
40 INJECTION SUBCUTANEOUS DAILY
Qty: 14 SYRINGE | Refills: 0 | Status: ON HOLD | OUTPATIENT
Start: 2020-04-02 | End: 2020-05-22 | Stop reason: HOSPADM

## 2020-04-02 RX ADMIN — POLYETHYLENE GLYCOL (3350) 17 G: 17 POWDER, FOR SOLUTION ORAL at 09:04

## 2020-04-02 RX ADMIN — MUPIROCIN: 20 OINTMENT TOPICAL at 09:04

## 2020-04-02 RX ADMIN — DOCUSATE SODIUM 100 MG: 100 CAPSULE, LIQUID FILLED ORAL at 09:04

## 2020-04-02 RX ADMIN — ALBUTEROL SULFATE 2 PUFF: 90 AEROSOL, METERED RESPIRATORY (INHALATION) at 09:04

## 2020-04-02 RX ADMIN — OXYCODONE 5 MG: 5 TABLET ORAL at 09:04

## 2020-04-02 RX ADMIN — ENOXAPARIN SODIUM 40 MG: 100 INJECTION SUBCUTANEOUS at 12:04

## 2020-04-02 NOTE — PT/OT/SLP PROGRESS
Occupational Therapy      Patient Name:  Cece Figueroa   MRN:  [id number]    Patient not seen today secondary to Other (Attempted to see patient prior to discharge, however, per pts nurse, pt already discharged home with son via wheelchair).    TIKI Salmeron  4/2/2020

## 2020-04-02 NOTE — PLAN OF CARE
"   04/02/20 0818   Post-Acute Status   Post-Acute Authorization Home Health   Home Health Status Pending Payor Review   Patient choice form signed by patient/caregiver List with quality metrics by geographic area provided   Discharge Delays None known at this time   Discharge Plan   Discharge Plan A Home with family;Home Health     Orders received for HH. KENTON faxed orders, h&p, and face sheet to [facility] via fax at [phone]. SW waiting to determine if services will be provided.     KENTON spoke with Elizabeth with [facility] to confirm equipment has been approved. Hospital bed, w/c, and bsc will be delivered to the home.     10:30AM  KENTON contacted [facility] to get the cost of lovenox. According to pharmacist, cost of lovenox is $82.64. KENTON notified pt's son, Samaria, of the cost of medication, Samaria verbalized understanding.     EDUCATION:  Pt's son provided with educational information on Post Op.  Information reviewed and placed in :My Healthcare Packet" to be brought home for  use as resource after discharge.  Information included:  signs and symptoms to look for at discharge. KENTON instructed pt to call the doctor if experiencing symptoms that may indicate a medical emergency: CALL 911 if signs and symptoms worsen. KENTON asked Samaria to provide SW with two signs and symptoms recently discussed. Samaria stated, " bleeding from surgical site, and chest pains". Reminded pt things she will be responsible for to manage her healthcare at home: getting Rx filled, attending follow up appointments, and taking medication as prescribed were discussed.   Teach back method used.  All questions answered.  Patient verbalized understanding of all information.      KENTON provided pt with a copy of follow-up appointments. KENTON explained/highlighted date, time, and location of each appointment. KENTON provided pt with a blue folder and instructed pt to place all medical documents in blue folder. KENTON explained to pt the nurse will provide an AVS with diagnosis " and instructed pt to place in blue folder and bring to follow-up appointment.     10:07AM  Lynda with [facility] confirmed services will be provided by [facility], however, they [facility] is having trouble with staffing. KENTON contacted Rivera with [employer] to assure pt will have a nurse. KENTON waiting on call back.

## 2020-04-02 NOTE — NURSING
Pt to be discharged per MD order. Respirations are even and unlabored.  IV d/c'd. Catheter intact. VS are WNL. Pt's son Samaria educated on discharge instructions and Lovenox administration. Verbalized understanding and administration procedure. Dressing to R hip changed per Dr. Norwood. Patient stable and tolerated transfer to w/ with assistance of son Samaria.

## 2020-04-02 NOTE — TELEPHONE ENCOUNTER
----- Message from Tiffanie Vital sent at 4/1/2020 10:34 AM CDT -----  Contact: marilu with [employer] [phone]  Type: Patient Call Back    Who called:marilu with [employer] [phone]    What is the request in detail:admitted to [facility] for surgical repair of femur fracture fyi    Can the clinic reply by [facility]?    Would the patient rather a call back or a response via [facility]? call    Best call back isabelle with [employer] [phone]    Additional Information:

## 2020-04-02 NOTE — PLAN OF CARE
Patient alert and oriented x4. Respirations even and unlabored. No distress noted. 02 noted at 3l. Skin warm and dry. Scattered bruising noted to skin. Dressing noted to right hip. Moderate drainage noted. Patient complains of pain to right hip. Pain medications given as needed. Iv saline locked. 1 unit of prbcs given this shift. Patient repositioned throughout shift to prevent skin breakdown. Patient has no complaints at this time. Instructed to call for any needs. Bed in lowest position. Call bell within reach. Bed alarms audible. Will continue to monitor.     Problem: Adult Inpatient Plan of Care  Goal: Plan of Care Review  Outcome: Ongoing, Progressing     Problem: Fall Injury Risk  Goal: Absence of Fall and Fall-Related Injury  Outcome: Ongoing, Progressing  Intervention: Identify and Manage Contributors to Fall Injury Risk  Flowsheets (Taken 4/2/2020 0473)  Self-Care Promotion: independence encouraged; BADL personal objects within reach  Medication Review/Management: medications reviewed; high risk medications identified     Problem: Skin Injury Risk Increased  Goal: Skin Health and Integrity  Outcome: Ongoing, Progressing

## 2020-04-02 NOTE — NURSING
Dr. Norwood notified of O2 89-91%, no SOB or distress. When pt awake and sitting up O2 97% on RA. IS given with proper demonstration, pt also give inhaler. Pt on for d/c per MD standpoint, pt needs to be OOB and sitting up. Awaiting son for d/c home.

## 2020-04-02 NOTE — NURSING
Patient has low grade temp. Spoke with keysha castro at this time about temp and blood transfusion. Order given to administer tylenol now. Will administer tylenol and administer blood. Will continue to monitor.

## 2020-04-02 NOTE — PROGRESS NOTES
OrthopedicPostop Progress Note    Postop day:     ID: The patient is a 89 y.o. female status post: RIGHT femur IM nail for hip fracture    Overnight Events: NAEON per nurse, ample UOP, pain controlled  1 unit PRBC overnight  satting 96 percent on RA, this AM  Adequate UOP    Vitals:    04/02/20 0921   BP:    Pulse: 102   Resp: 18   Temp:        Drain Output  04/01 0701 - 04/02 0700  In: 480   Out: 900     Physical Exam:  NAD, A/O  fully.  Wound c/d/i with clean dressing.  No focal motor or sensory deficits noted.    Assessment: The patient is a 89 y.o. female status post: RIGHT hip IM nail doing well    Plan:  1) Antibiotics: post op ancef x 24 hrs comlpeted  2) Weight bearing status: WBAT with PT, will give pt a boot today for her non-displaced lateral malleolus ankle fracture  3) Labs: AM labs hb 8.7 p 1 U pRBC  4) DVT Prophylaxis: lovenox daily  5) Lines/Drains: PIV  6) Dispo: home with home health today, aid and home lvnx x 2 weeks, discussed with son Samaria.     Cuco

## 2020-04-02 NOTE — PLAN OF CARE
04/02/20 1049   Medicare Message   Important Message from Medicare regarding Discharge Appeal Rights Given to patient/caregiver;Explained to patient/caregiver;Other (comments)  (IMM completed with pt's son, Samaria, via phone to explain IMM. Samaria verbalized understanding. )   Date IMM was signed 04/02/20   Time IMM was signed 0058

## 2020-04-02 NOTE — PLAN OF CARE
[facility]    HOME HEALTH ORDERS  FACE TO FACE ENCOUNTER    Patient Name: Cece Figueroa  YOB: 1929    PCP: Janel Barajas MD   PCP Address: [address] [place] [address]  PCP Phone Number: [phone]  PCP Fax: [phone]    Encounter Date: 04/01/2020    Admit to Home Health    Diagnoses:  Active Hospital Problems    Diagnosis  POA    *Closed fracture of right hip [S72.001A]  Yes      Resolved Hospital Problems   No resolved problems to display.       No future appointments.        I have seen and examined this patient face to face today. My clinical findings that support the need for the home health skilled services and home bound status are the following:  Weakness/numbness causing balance and gait disturbance due to Fracture making it taxing to leave home.  Requiring assistive device to leave home due to unsteady gait caused by Fracture.  Medical restrictions requiring assistance of another human to leave home due to  Unstable ambulation, Frequent Falls, Post surgery monitoring and pt requires aid for adl assistance.    Allergies:Review of patient's allergies indicates:  No Known Allergies    Diet: regular diet    Activities: activity as tolerated, wbat with assistance    Nursing:   SN to complete comprehensive assessment including routine vital signs. Instruct on disease process and s/s of complications to report to MD. Follow specific home health arthoplasty protocol. Review/verify medication list sent home with the patient at time of discharge  and instruct patient/caregiver as needed. If coumadin ordered, coumadin clinic to manage INR with INR draws 2x per week with a goal to maintain INR between 1.8 and 2.2. Frequency may be adjusted depending on start of care date.    Notify MD if SBP > 160 or < 90; DBP > 90 or < 50; HR > 120 or < 50; Temp > 101; Other:   <92 percent SpO2    Home Medical Equipment:  Walker, 3-1 bedside commode, transfer tub  bench    CONSULTS:    Physical Therapy to evaluate and treat. Evaluate for home safety and equipment needs; Establish/upgrade home exercise program. Perform / instruct on therapeutic exercises, gait training, transfer training, and Range of Motion.    OTHER:  Occupational Therapy to evaluate and treat. Evaluate home environment for safety and equipment needs. Perform/Instruct on transfers, ADL training, ROM, and therapeutic exercises.  Aide to provide assistance with personal care, ADLs, and vital signs 3x/week.    MISCELLANEOUS CARE:  Routine Skin for Bedridden Patients: Instruct patient/caregiver to apply moisture barrier cream to all skin folds and wet areas in perineal area daily and after baths and all bowel movements.    WOUND CARE ORDERS  Follow Home Health specific protocol.      Medications: Review discharge medications with patient and family and provide education.      There are no discharge medications for this patient.      I certify that this patient is confined to her home and needs physical therapy and occupational therapy.

## 2020-04-02 NOTE — PLAN OF CARE
04/02/20 1046   Final Note   Assessment Type Final Discharge Note   Anticipated Discharge Disposition Home   What phone number can be called within the next 1-3 days to see how you are doing after discharge? [phone]   Hospital Follow Up  Appt(s) scheduled? Yes   Discharge plans and expectations educations in teach back method with documentation complete? Yes   Right Care Referral Info   Post Acute Recommendation No Care

## 2020-04-03 NOTE — PHYSICIAN QUERY
"PT Name: Cece Figueroa  MR #: [id number]    Physician Query Form - Hematology Clarification      CDS/: Malissa Hernandez               Contact information:[email or web address]    This form is a permanent document in the medical record.      Query Date: April 3, 2020    By submitting this query, we are merely seeking further clarification of documentation. Please utilize your independent clinical judgment when addressing the question(s) below.    The Medical record contains the following:   Indicators  Supporting Clinical Findings Location in Medical Record    "Anemia" documented      x H & H =       10.5 & 34.4--->8.1 &27.0           7.3 & 24.8----->8.7 & 28.7    Lab Results 3/30, 3/31, 4/1 & 4/2/2020    x BP =                     HR=  (101-138)/ (49-58)       HR=  Vital Signs Flow Sheet 4/1/2020    "GI bleeding" documented      Acute bleeding (Non GI site)      x Transfusion(s)  1 unit PRBC overnight  Orthopedic Surgery Progress Notes File Time: 4/2/2020 10: 12 AM      x Treatment:  Daily CBC Lab Results 3/30, 3/31, 4/1 & 4/2/2020      x Other:  PROCEDURE:  Right hip IM nailing for intertrochanteric hip fracture.       ESTIMATED BLOOD LOSS:  100 mL  Orthopedic Surgery Op Note File Time: 3/31/2020 8:53 AM     Provider, please specify diagnosis or diagnoses associated with above clinical findings.    [ x ] Acute blood loss anemia expected post-operatively   [  ] Acute blood loss anemia     [  ] Other Hematological Diagnosis (please specify):     [  ] Clinically Undetermined       Please document in your progress notes daily for the duration of treatment, until resolved, and include in your discharge summary.                                                                                                      "

## 2020-04-16 ENCOUNTER — EXTERNAL HOME HEALTH (OUTPATIENT)
Dept: HOME HEALTH SERVICES | Facility: HOSPITAL | Age: 85
End: 2020-04-16
Payer: MEDICARE

## 2020-04-17 ENCOUNTER — DOCUMENT SCAN (OUTPATIENT)
Dept: HOME HEALTH SERVICES | Facility: HOSPITAL | Age: 85
End: 2020-04-17
Payer: MEDICARE

## 2020-04-17 NOTE — DISCHARGE SUMMARY
Orthopaedic Discharge Summary  Cece Figueroa, 2/1/1929   [id number], [id number]      SUMMARY     Admit Date: 3/30/2020    Attending Physician: Cuco    Discharge Date: 4/2/2020  2:04 PM ; also see date of last progress note from the Orthopaedic surgery department    Principal Diagnosis: hip fracture  Secondary Diagnosis:     1. Closed fracture of right hip, initial encounter    2. Pre-op evaluation    3. Pre-op exam    4. Fracture, hip            History of Present Illness: Cece Figueroa is a 89 y.o. female with a history significant for hip fracture.  An extensive discussion was had concerning the risks, benefits, alternatives, and indications for surgical intervention.  All questions were answered, and informed consent was obtained. No guarantees were made. The patient elected to proceed with surgery.      Hospital Course: On the aforementioned date, the patient underwent orthopaedic surgical intervention. See the operative note for details. Post-operatively the patient was transferred to the recovery room and then to the floor.    The interim of the hospital stay from arrival on the floor up to discharge has been largely uncomplicated. The patient progressed well with physical therapy on a daily basis. See the PT notes for details.     Her discharge was expedited secondary to the current corona virus epicdemic.  Patient has a very weak large and involved family in her care.  They adamantly wanted her home and not in a skilled nursing.  She was felt ready for discharge.    Disposition: Stable. Discharged to home with home health.    Activity: As tolerated with assistive devices PRN.     Diet: Resume a healthy, balanced diet    Follow-up: Patient will follow-up with the attending surgeon in the bone and joint Orthopaedic Surgery clinic within two weeks of the operative date for wound check and re-evaluation of the post-operative plan.     Medications: See below for detailed medication  "reconciliation. This includes DVT prophylaxis    The patient was instructed to monitor for signs and symptoms of infection and to contact the orthopaedic surgery clinic with any questions or concerns.      Patient Instructions:   Discharge Medication List as of 4/2/2020 12:52 PM      START taking these medications    Details   enoxaparin (LOVENOX) 40 mg/0.4 mL Syrg Inject 0.4 mLs (40 mg total) into the skin once daily., Starting u 4/2/2020, Print      HYDROcodone-acetaminophen (NORCO)  mg per tablet Take 1 tablet by mouth every 6 (six) hours as needed for Pain., Starting u 4/2/2020, Print             Discharge Procedure Orders (must include Diet, Follow-up, Activity)   Discharge Procedure Orders (must include Diet, Follow-up, Activity)   COMMODE FOR HOME USE     Order Specific Question Answer Comments   Type: Standard    Height: 5'2"    Weight: 60.2 kg (132 lb 11.5 oz)    Does patient have medical equipment at home? wheelchair According to pt, wheelchair was given to her by someone else.    Length of need (1-99 months): 99      HOSPITAL BED FOR HOME USE     Order Specific Question Answer Comments   Type: Semi-electric    Length of need (1-99 months): 99    Does patient have medical equipment at home? wheelchair According to pt, wheelchair was given to her by someone else.    Height: 5'2"    Weight: 60.2 kg (132 lb 11.5 oz)    Please check all that apply: Patient requires positioning of the body in ways not feasible in an ordinary bed due to a medical condition which is expected to last at least one month.      WHEELCHAIR FOR HOME USE     Order Specific Question Answer Comments   Hours in W/C per day: 5    Type of Wheelchair: Standard    Size(Width): 18"(STD adult)    Leg Support: STD footrests    Lap Belt: Velcro    Cushion: Basic    Height: 5'2"    Weight: 60.2 kg (132 lb 11.5 oz)    Does patient have medical equipment at home? wheelchair According to pt, wheelchair was given to her by someone else.  "   Length of need (1-99 months): 99    Please check all that apply: Caregiver is capable and willing to operate wheelchair safely.    Please check all that apply: Patient mobility limitations cannot be sufficiently resolved by the use of other ambulatory therapies.      Diet Adult Regular     Notify your health care provider if you experience any of the following:  increased confusion or weakness     Notify your health care provider if you experience any of the following:  persistent dizziness, light-headedness, or visual disturbances     Notify your health care provider if you experience any of the following:  worsening rash     Notify your health care provider if you experience any of the following:  severe persistent headache     Notify your health care provider if you experience any of the following:  difficulty breathing or increased cough     Notify your health care provider if you experience any of the following:  redness, tenderness, or signs of infection (pain, swelling, redness, odor or green/yellow discharge around incision site)     Notify your health care provider if you experience any of the following:  severe uncontrolled pain     Notify your health care provider if you experience any of the following:  persistent nausea and vomiting or diarrhea     Notify your health care provider if you experience any of the following:  temperature >100.4      Remove dressing in 72 hours     Weight bearing restrictions (specify):   Order Comments: With PT assistance            MARIE Norwood

## 2020-04-24 ENCOUNTER — TELEPHONE (OUTPATIENT)
Dept: FAMILY MEDICINE | Facility: CLINIC | Age: 85
End: 2020-04-24

## 2020-04-24 ENCOUNTER — PATIENT MESSAGE (OUTPATIENT)
Dept: FAMILY MEDICINE | Facility: CLINIC | Age: 85
End: 2020-04-24

## 2020-04-24 ENCOUNTER — OFFICE VISIT (OUTPATIENT)
Dept: FAMILY MEDICINE | Facility: CLINIC | Age: 85
End: 2020-04-24
Payer: MEDICARE

## 2020-04-24 DIAGNOSIS — L89.150 DECUBITUS ULCER OF SACRAL REGION, UNSTAGEABLE: ICD-10-CM

## 2020-04-24 DIAGNOSIS — S82.64XA CLOSED NONDISPLACED FRACTURE OF LATERAL MALLEOLUS OF RIGHT FIBULA, INITIAL ENCOUNTER: ICD-10-CM

## 2020-04-24 DIAGNOSIS — R09.02 HYPOXIA: ICD-10-CM

## 2020-04-24 DIAGNOSIS — S72.001A CLOSED FRACTURE OF RIGHT HIP, INITIAL ENCOUNTER: Primary | ICD-10-CM

## 2020-04-24 DIAGNOSIS — L97.429 HEEL ULCERATION, LEFT, WITH UNSPECIFIED SEVERITY: ICD-10-CM

## 2020-04-24 DIAGNOSIS — S72.051A CLOSED FRACTURE OF HEAD OF RIGHT FEMUR, INITIAL ENCOUNTER: ICD-10-CM

## 2020-04-24 PROCEDURE — 99499 UNLISTED E&M SERVICE: CPT | Mod: 95,,, | Performed by: INTERNAL MEDICINE

## 2020-04-24 PROCEDURE — 1159F PR MEDICATION LIST DOCUMENTED IN MEDICAL RECORD: ICD-10-PCS | Mod: 95,,, | Performed by: INTERNAL MEDICINE

## 2020-04-24 PROCEDURE — 99214 OFFICE O/P EST MOD 30 MIN: CPT | Mod: 95,,, | Performed by: INTERNAL MEDICINE

## 2020-04-24 PROCEDURE — 99214 PR OFFICE/OUTPT VISIT, EST, LEVL IV, 30-39 MIN: ICD-10-PCS | Mod: 95,,, | Performed by: INTERNAL MEDICINE

## 2020-04-24 PROCEDURE — 1159F MED LIST DOCD IN RCRD: CPT | Mod: 95,,, | Performed by: INTERNAL MEDICINE

## 2020-04-24 PROCEDURE — 99499 RISK ADDL DX/OHS AUDIT: ICD-10-PCS | Mod: 95,,, | Performed by: INTERNAL MEDICINE

## 2020-04-24 NOTE — TELEPHONE ENCOUNTER
----- Message from Janel Barajas MD sent at 4/24/2020  2:55 PM CDT -----  Please grab home health orders to fax to [facility] using my stamp. Pt is currently set up with [facility].

## 2020-04-24 NOTE — PROGRESS NOTES
SUBJECTIVE     No chief complaint on file.      HPI  Cece Figueroa is a 89 y.o. female with multiple medical diagnoses as listed in the medical history and problem list that presents for follow-up after R hip/R femur fracture repair. Pt's daughter reports she is doing okay except they just took her vitals and her HR is 103 with a pulse ox of 85-90% on RA. Pt does report some occasional SOB and pt has been using the incentive spirometer supplied at hospital discharge. Her mother also has a sacral decubitus and L heel ulcer. The sacral ulcer has enlarged despite using A&D ointment and is now like an open blister. Denies any fever, chills, or night sweats.     PAST MEDICAL HISTORY:  History reviewed. No pertinent past medical history.    PAST SURGICAL HISTORY:  Past Surgical History:   Procedure Laterality Date    INTRAMEDULLARY RODDING OF FEMUR Right 3/30/2020    Procedure: INSERTION, INTRAMEDULLARY ARMANDO, FEMUR-HIP;  Surgeon: Armen Norwood MD;  Location: [facility];  Service: Orthopedics;  Laterality: Right;  JALEESA       SOCIAL HISTORY:  Social History     Socioeconomic History    Marital status:      Spouse name: Not on file    Number of children: Not on file    Years of education: Not on file    Highest education level: Not on file   Occupational History    Not on file   Social Needs    Financial resource strain: Not on file    Food insecurity:     Worry: Not on file     Inability: Not on file    Transportation needs:     Medical: Not on file     Non-medical: Not on file   Tobacco Use    Smoking status: Never Smoker    Smokeless tobacco: Never Used   Substance and Sexual Activity    Alcohol use: Not on file    Drug use: Not on file    Sexual activity: Not on file   Lifestyle    Physical activity:     Days per week: 0 days     Minutes per session: 0 min    Stress: Not at all   Relationships    Social connections:     Talks on phone: More than three times a week     Gets together:  Never     Attends Taoism service: Not on file     Active member of club or organization: No     Attends meetings of clubs or organizations: Never     Relationship status:    Other Topics Concern    Not on file   Social History Narrative    Not on file       FAMILY HISTORY:  History reviewed. No pertinent family history.    ALLERGIES AND MEDICATIONS: updated and reviewed.  Review of patient's allergies indicates:  No Known Allergies  Current Outpatient Medications   Medication Sig Dispense Refill    enoxaparin (LOVENOX) 40 mg/0.4 mL Syrg Inject 0.4 mLs (40 mg total) into the skin once daily. 14 Syringe 0    HYDROcodone-acetaminophen (NORCO)  mg per tablet Take 1 tablet by mouth every 6 (six) hours as needed for Pain. 51 tablet 0     No current facility-administered medications for this visit.        ROS  Review of Systems   Constitutional: Negative for chills and fever.   HENT: Negative for hearing loss and sore throat.    Eyes: Negative for visual disturbance.   Respiratory: Positive for shortness of breath. Negative for cough.    Cardiovascular: Negative for chest pain, palpitations and leg swelling.   Gastrointestinal: Negative for abdominal pain, constipation, diarrhea, nausea and vomiting.   Genitourinary: Negative for dysuria, frequency and urgency.   Musculoskeletal: Negative for arthralgias, joint swelling and myalgias.   Skin: Positive for wound (sacrum and L heel). Negative for rash.   Neurological: Negative for headaches.   Psychiatric/Behavioral: Negative for agitation and confusion. The patient is not nervous/anxious.          OBJECTIVE     Physical Exam  There were no vitals filed for this visit. There is no height or weight on file to calculate BMI.            Physical Exam   Constitutional: She is oriented to person, place, and time. She appears well-developed and well-nourished. No distress.   HENT:   Head: Normocephalic and atraumatic.   Right Ear: External ear normal.   Left  Ear: External ear normal.   Nose: Nose normal.   Mouth/Throat: Oropharynx is clear and moist.   Eyes: Conjunctivae and EOM are normal. Right eye exhibits no discharge. Left eye exhibits no discharge. No scleral icterus.   Neck: Normal range of motion. Neck supple. No JVD present. No tracheal deviation present.   Pulmonary/Chest: Effort normal. No respiratory distress.   Musculoskeletal: Normal range of motion. She exhibits no edema, tenderness or deformity.   Neurological: She is alert and oriented to person, place, and time. She exhibits normal muscle tone. Coordination normal.   Skin: Skin is warm and dry. No rash noted. No erythema.   Psychiatric: She has a normal mood and affect. Her behavior is normal. Judgment and thought content normal.         Health Maintenance       Date Due Completion Date    Lipid Panel 02/01/1929 ---    TETANUS VACCINE 02/01/1947 ---    Shingles Vaccine (1 of 2) 02/01/1979 ---    Pneumococcal Vaccine (65+ Low/Medium Risk) (1 of 2 - PCV13) 02/01/1994 ---    Influenza Vaccine (1) 09/01/2019 ---            ASSESSMENT     89 y.o. female with     1. Closed fracture of right hip, initial encounter    2. Closed fracture of head of right femur, initial encounter    3. Closed nondisplaced fracture of lateral malleolus of right fibula, initial encounter    4. Hypoxia    5. Decubitus ulcer of sacral region, unstageable    6. Heel ulceration, left, with unspecified severity        PLAN:     1. Closed fracture of right hip, initial encounter  - Stable; no acute issues  - Continue management per Ortho  - Ambulatory referral/consult to Home Health; Future  - Independently reviewed hospital lab/imaging    2. Closed fracture of head of right femur, initial encounter  - As above  - Ambulatory referral/consult to Home Health; Future    3. Closed nondisplaced fracture of lateral malleolus of right fibula, initial encounter  - As above  - Ambulatory referral/consult to Home Health; Future    4. Hypoxia  -  Pt advised to continue use of incentive spirometer as previously instructed  - Pt with resting O2 sat at 85% on RA; will start home O2 with staff sending order STAT, but family advised to go straight to ED for any respiratory distress before home O2 arrives  - OXYGEN FOR HOME USE    5. Decubitus ulcer of sacral region, unstageable  - Unable to visualize well on video visit, but possibly Stage 2?; recommend use of hydrocolloid dressing with change q72 until evaluated by Home Health; also encouraged offloading with turning q2  - Ambulatory referral/consult to Home Health; Future    6. Heel ulceration, left, with unspecified severity  - Unable to visualize well on video visit, described as blister; recommend use of Betadine application daily until evaluated by Home Health; also recommend continuous use of heel floaties  - Ambulatory referral/consult to Home Health; Future        RTC in 1-2 weeks as needed for any acute worsening of current condition or failure to improve       Consult Start Time: 04/24/2020 14:18  Consult End Time: 04/24/2020 14:31      The patient location is: home  The chief complaint leading to consultation is: f/u after R hip surg now with ulcers  Visit type: audiovisual  Total time spent with patient: 13 min  Each patient to whom he or she provides medical services by telemedicine is:  (1) informed of the relationship between the physician and patient and the respective role of any other health care provider with respect to management of the patient; and (2) notified that he or she may decline to receive medical services by telemedicine and may withdraw from such care at any time.    Notes: As above         Janel Barajas MD  04/24/2020 2:18 PM        Follow up in about 2 weeks (around 5/8/2020), or if symptoms worsen or fail to improve.

## 2020-04-24 NOTE — Clinical Note
Please grab home health orders to fax to [facility] using my stamp. Pt is currently set up with [facility].

## 2020-04-27 ENCOUNTER — TELEPHONE (OUTPATIENT)
Dept: FAMILY MEDICINE | Facility: CLINIC | Age: 85
End: 2020-04-27

## 2020-04-27 NOTE — TELEPHONE ENCOUNTER
----- Message from Janel Barajas MD sent at 4/24/2020  4:34 PM CDT -----  Please get oxygen order sent STAT. Thanks!

## 2020-04-29 ENCOUNTER — DOCUMENT SCAN (OUTPATIENT)
Dept: HOME HEALTH SERVICES | Facility: HOSPITAL | Age: 85
End: 2020-04-29
Payer: MEDICARE

## 2020-05-08 ENCOUNTER — TELEPHONE (OUTPATIENT)
Dept: FAMILY MEDICINE | Facility: CLINIC | Age: 85
End: 2020-05-08

## 2020-05-08 DIAGNOSIS — S82.64XA CLOSED NONDISPLACED FRACTURE OF LATERAL MALLEOLUS OF RIGHT FIBULA, INITIAL ENCOUNTER: ICD-10-CM

## 2020-05-08 DIAGNOSIS — S72.001A CLOSED FRACTURE OF RIGHT HIP, INITIAL ENCOUNTER: ICD-10-CM

## 2020-05-08 DIAGNOSIS — S72.051A CLOSED FRACTURE OF HEAD OF RIGHT FEMUR, INITIAL ENCOUNTER: Primary | ICD-10-CM

## 2020-05-08 NOTE — TELEPHONE ENCOUNTER
Call placed to Fady JESUS, and informed of Provider response. He states that where the Pt is with her daughter, that she is going to be seen on Monday or Tuesday. That they will collect the UA then.

## 2020-05-08 NOTE — TELEPHONE ENCOUNTER
----- Message from Leyda Elias sent at 5/8/2020 10:48 AM CDT -----  [facility]  Type: Patient Call Back    Who called: [facility]    What is the request in detail: [facility] is requesting an order for a amanda lift to be sent to [facility]. [facility] wanted to inform the doctor that the pt has been more confused last 2 nights and a poor appetite last 3 days. [facility] wants to know if they can continue home [facility] for the pt for next 4 weeks, twice a week.      Can the clinic reply by [facility]?    Would the patient rather a call back or a response via [facility]? call back    Best call back number: [phone]

## 2020-05-08 NOTE — TELEPHONE ENCOUNTER
Delia lift ordered. Will sign order this weekend. Okay to extend home health as warranted. Please have nurse check urine for UA and UCx since pt confused with poor appetite.

## 2020-05-16 ENCOUNTER — HOSPITAL ENCOUNTER (INPATIENT)
Facility: HOSPITAL | Age: 85
LOS: 6 days | Discharge: HOSPICE | DRG: 299 | End: 2020-05-22
Attending: EMERGENCY MEDICINE | Admitting: HOSPITALIST
Payer: MEDICARE

## 2020-05-16 DIAGNOSIS — R11.10 VOMITING: ICD-10-CM

## 2020-05-16 DIAGNOSIS — E86.0 DEHYDRATION: Primary | ICD-10-CM

## 2020-05-16 DIAGNOSIS — E83.52 HYPERCALCEMIA: ICD-10-CM

## 2020-05-16 DIAGNOSIS — Z51.5 PALLIATIVE CARE ENCOUNTER: ICD-10-CM

## 2020-05-16 DIAGNOSIS — K56.41 FECAL IMPACTION: ICD-10-CM

## 2020-05-16 DIAGNOSIS — I49.8 SINUS ARRHYTHMIA: ICD-10-CM

## 2020-05-16 DIAGNOSIS — F44.89 CHRONIC CONFUSIONAL STATE: ICD-10-CM

## 2020-05-16 DIAGNOSIS — I48.91 A-FIB: ICD-10-CM

## 2020-05-16 PROBLEM — Z71.89 ADVANCED CARE PLANNING/COUNSELING DISCUSSION: Status: ACTIVE | Noted: 2020-05-16

## 2020-05-16 PROBLEM — E87.0 HYPERNATREMIA: Status: ACTIVE | Noted: 2020-05-16

## 2020-05-16 PROBLEM — I82.411 ACUTE DEEP VEIN THROMBOSIS (DVT) OF FEMORAL VEIN OF RIGHT LOWER EXTREMITY: Status: ACTIVE | Noted: 2020-05-16

## 2020-05-16 PROBLEM — R79.89 ELEVATED TROPONIN: Status: ACTIVE | Noted: 2020-05-16

## 2020-05-16 LAB
ALBUMIN SERPL BCP-MCNC: 2.7 G/DL (ref 3.5–5.2)
ALP SERPL-CCNC: 108 U/L (ref 55–135)
ALT SERPL W/O P-5'-P-CCNC: 10 U/L (ref 10–44)
ANION GAP SERPL CALC-SCNC: 12 MMOL/L (ref 8–16)
APTT BLDCRRT: 23.5 SEC (ref 21–32)
AST SERPL-CCNC: 16 U/L (ref 10–40)
BASOPHILS # BLD AUTO: 0.03 K/UL (ref 0–0.2)
BASOPHILS NFR BLD: 0.2 % (ref 0–1.9)
BILIRUB SERPL-MCNC: 1 MG/DL (ref 0.1–1)
BILIRUB UR QL STRIP: NEGATIVE
BUN SERPL-MCNC: 39 MG/DL (ref 10–30)
CALCIUM SERPL-MCNC: 13.5 MG/DL (ref 8.7–10.5)
CHLORIDE SERPL-SCNC: 110 MMOL/L (ref 95–110)
CLARITY UR: CLEAR
CO2 SERPL-SCNC: 29 MMOL/L (ref 23–29)
COLOR UR: ABNORMAL
CREAT SERPL-MCNC: 1 MG/DL (ref 0.5–1.4)
DIFFERENTIAL METHOD: ABNORMAL
EOSINOPHIL # BLD AUTO: 0 K/UL (ref 0–0.5)
EOSINOPHIL NFR BLD: 0.2 % (ref 0–8)
ERYTHROCYTE [DISTWIDTH] IN BLOOD BY AUTOMATED COUNT: 17.2 % (ref 11.5–14.5)
EST. GFR  (AFRICAN AMERICAN): 57 ML/MIN/1.73 M^2
EST. GFR  (NON AFRICAN AMERICAN): 49 ML/MIN/1.73 M^2
GLUCOSE SERPL-MCNC: 126 MG/DL (ref 70–110)
GLUCOSE UR QL STRIP: NEGATIVE
HCT VFR BLD AUTO: 39 % (ref 37–48.5)
HGB BLD-MCNC: 11.8 G/DL (ref 12–16)
HGB UR QL STRIP: NEGATIVE
HYALINE CASTS #/AREA URNS LPF: 10 /LPF
IMM GRANULOCYTES # BLD AUTO: 0.13 K/UL (ref 0–0.04)
IMM GRANULOCYTES NFR BLD AUTO: 0.7 % (ref 0–0.5)
INR PPP: 1 (ref 0.8–1.2)
KETONES UR QL STRIP: NEGATIVE
LEUKOCYTE ESTERASE UR QL STRIP: NEGATIVE
LYMPHOCYTES # BLD AUTO: 1.3 K/UL (ref 1–4.8)
LYMPHOCYTES NFR BLD: 7.1 % (ref 18–48)
MAGNESIUM SERPL-MCNC: 2.7 MG/DL (ref 1.6–2.6)
MCH RBC QN AUTO: 27.8 PG (ref 27–31)
MCHC RBC AUTO-ENTMCNC: 30.3 G/DL (ref 32–36)
MCV RBC AUTO: 92 FL (ref 82–98)
MICROSCOPIC COMMENT: ABNORMAL
MONOCYTES # BLD AUTO: 1.2 K/UL (ref 0.3–1)
MONOCYTES NFR BLD: 6.4 % (ref 4–15)
NEUTROPHILS # BLD AUTO: 15.4 K/UL (ref 1.8–7.7)
NEUTROPHILS NFR BLD: 85.4 % (ref 38–73)
NITRITE UR QL STRIP: NEGATIVE
NRBC BLD-RTO: 0 /100 WBC
PH UR STRIP: 5 [PH] (ref 5–8)
PLATELET # BLD AUTO: 278 K/UL (ref 150–350)
PMV BLD AUTO: 11.6 FL (ref 9.2–12.9)
POTASSIUM SERPL-SCNC: 3.1 MMOL/L (ref 3.5–5.1)
PROT SERPL-MCNC: 6.7 G/DL (ref 6–8.4)
PROT UR QL STRIP: NEGATIVE
PROTHROMBIN TIME: 11 SEC (ref 9–12.5)
RBC # BLD AUTO: 4.25 M/UL (ref 4–5.4)
SARS-COV-2 RDRP RESP QL NAA+PROBE: NEGATIVE
SODIUM SERPL-SCNC: 151 MMOL/L (ref 136–145)
SP GR UR STRIP: 1.02 (ref 1–1.03)
TROPONIN I SERPL DL<=0.01 NG/ML-MCNC: 0.04 NG/ML (ref 0–0.03)
TSH SERPL DL<=0.005 MIU/L-ACNC: 3.17 UIU/ML (ref 0.4–4)
URN SPEC COLLECT METH UR: ABNORMAL
UROBILINOGEN UR STRIP-ACNC: ABNORMAL EU/DL
WBC # BLD AUTO: 18.01 K/UL (ref 3.9–12.7)

## 2020-05-16 PROCEDURE — 83735 ASSAY OF MAGNESIUM: CPT

## 2020-05-16 PROCEDURE — 84443 ASSAY THYROID STIM HORMONE: CPT

## 2020-05-16 PROCEDURE — 93010 ELECTROCARDIOGRAM REPORT: CPT | Mod: ,,, | Performed by: INTERNAL MEDICINE

## 2020-05-16 PROCEDURE — U0002 COVID-19 LAB TEST NON-CDC: HCPCS

## 2020-05-16 PROCEDURE — 80053 COMPREHEN METABOLIC PANEL: CPT

## 2020-05-16 PROCEDURE — 80048 BASIC METABOLIC PNL TOTAL CA: CPT

## 2020-05-16 PROCEDURE — 36415 COLL VENOUS BLD VENIPUNCTURE: CPT

## 2020-05-16 PROCEDURE — 85610 PROTHROMBIN TIME: CPT

## 2020-05-16 PROCEDURE — 99285 EMERGENCY DEPT VISIT HI MDM: CPT | Mod: 25

## 2020-05-16 PROCEDURE — 25000003 PHARM REV CODE 250: Performed by: EMERGENCY MEDICINE

## 2020-05-16 PROCEDURE — 84484 ASSAY OF TROPONIN QUANT: CPT | Mod: 91

## 2020-05-16 PROCEDURE — 25000003 PHARM REV CODE 250: Performed by: PHYSICIAN ASSISTANT

## 2020-05-16 PROCEDURE — 93010 EKG 12-LEAD: ICD-10-PCS | Mod: ,,, | Performed by: INTERNAL MEDICINE

## 2020-05-16 PROCEDURE — 85730 THROMBOPLASTIN TIME PARTIAL: CPT

## 2020-05-16 PROCEDURE — 93005 ELECTROCARDIOGRAM TRACING: CPT

## 2020-05-16 PROCEDURE — 11000001 HC ACUTE MED/SURG PRIVATE ROOM

## 2020-05-16 PROCEDURE — 96374 THER/PROPH/DIAG INJ IV PUSH: CPT

## 2020-05-16 PROCEDURE — 81000 URINALYSIS NONAUTO W/SCOPE: CPT

## 2020-05-16 PROCEDURE — 63600175 PHARM REV CODE 636 W HCPCS: Performed by: PHYSICIAN ASSISTANT

## 2020-05-16 PROCEDURE — 85025 COMPLETE CBC W/AUTO DIFF WBC: CPT

## 2020-05-16 RX ORDER — SODIUM CHLORIDE AND POTASSIUM CHLORIDE 150; 450 MG/100ML; MG/100ML
INJECTION, SOLUTION INTRAVENOUS CONTINUOUS
Status: DISCONTINUED | OUTPATIENT
Start: 2020-05-16 | End: 2020-05-17

## 2020-05-16 RX ORDER — SODIUM CHLORIDE 0.9 % (FLUSH) 0.9 %
10 SYRINGE (ML) INJECTION
Status: DISCONTINUED | OUTPATIENT
Start: 2020-05-16 | End: 2020-05-22 | Stop reason: HOSPADM

## 2020-05-16 RX ORDER — ENOXAPARIN SODIUM 100 MG/ML
40 INJECTION SUBCUTANEOUS EVERY 24 HOURS
Status: DISCONTINUED | OUTPATIENT
Start: 2020-05-16 | End: 2020-05-16

## 2020-05-16 RX ORDER — POTASSIUM CHLORIDE 7.45 MG/ML
10 INJECTION INTRAVENOUS
Status: DISCONTINUED | OUTPATIENT
Start: 2020-05-16 | End: 2020-05-16

## 2020-05-16 RX ORDER — METOPROLOL TARTRATE 25 MG/1
25 TABLET, FILM COATED ORAL 2 TIMES DAILY
Status: DISCONTINUED | OUTPATIENT
Start: 2020-05-16 | End: 2020-05-16

## 2020-05-16 RX ORDER — DEXTROSE MONOHYDRATE, SODIUM CHLORIDE, AND POTASSIUM CHLORIDE 50; 1.49; 9 G/1000ML; G/1000ML; G/1000ML
INJECTION, SOLUTION INTRAVENOUS CONTINUOUS
Status: DISCONTINUED | OUTPATIENT
Start: 2020-05-16 | End: 2020-05-16

## 2020-05-16 RX ORDER — POLYETHYLENE GLYCOL 3350 17 G/17G
17 POWDER, FOR SOLUTION ORAL 3 TIMES DAILY PRN
Status: DISCONTINUED | OUTPATIENT
Start: 2020-05-16 | End: 2020-05-16

## 2020-05-16 RX ORDER — ENOXAPARIN SODIUM 100 MG/ML
60 INJECTION SUBCUTANEOUS
Status: DISCONTINUED | OUTPATIENT
Start: 2020-05-16 | End: 2020-05-16

## 2020-05-16 RX ORDER — ENOXAPARIN SODIUM 100 MG/ML
60 INJECTION SUBCUTANEOUS
Status: DISCONTINUED | OUTPATIENT
Start: 2020-05-16 | End: 2020-05-17

## 2020-05-16 RX ORDER — DILTIAZEM HYDROCHLORIDE 5 MG/ML
10 INJECTION INTRAVENOUS ONCE
Status: COMPLETED | OUTPATIENT
Start: 2020-05-16 | End: 2020-05-16

## 2020-05-16 RX ORDER — ACETAMINOPHEN 160 MG/5ML
SUSPENSION ORAL
COMMUNITY

## 2020-05-16 RX ORDER — METOPROLOL TARTRATE 25 MG/1
12.5 TABLET ORAL 2 TIMES DAILY
Status: DISCONTINUED | OUTPATIENT
Start: 2020-05-16 | End: 2020-05-22 | Stop reason: HOSPADM

## 2020-05-16 RX ORDER — ACETAMINOPHEN 500 MG
500 TABLET ORAL EVERY 6 HOURS PRN
Status: DISCONTINUED | OUTPATIENT
Start: 2020-05-16 | End: 2020-05-17

## 2020-05-16 RX ORDER — ONDANSETRON 2 MG/ML
4 INJECTION INTRAMUSCULAR; INTRAVENOUS EVERY 8 HOURS PRN
Status: DISCONTINUED | OUTPATIENT
Start: 2020-05-16 | End: 2020-05-17

## 2020-05-16 RX ADMIN — ENOXAPARIN SODIUM 60 MG: 100 INJECTION SUBCUTANEOUS at 09:05

## 2020-05-16 RX ADMIN — SODIUM CHLORIDE AND POTASSIUM CHLORIDE: 4.5; 1.49 INJECTION, SOLUTION INTRAVENOUS at 09:05

## 2020-05-16 RX ADMIN — METOPROLOL TARTRATE 12.5 MG: 25 TABLET, FILM COATED ORAL at 09:05

## 2020-05-16 RX ADMIN — SODIUM CHLORIDE 1000 ML: 0.9 INJECTION, SOLUTION INTRAVENOUS at 02:05

## 2020-05-16 RX ADMIN — DILTIAZEM HYDROCHLORIDE 10 MG: 5 INJECTION INTRAVENOUS at 01:05

## 2020-05-16 NOTE — ASSESSMENT & PLAN NOTE
Her EKG today appears to be sinus with PAC. Her rate has maintained over 100. Will start low dose beta blocker. Place on DVT prophylaxis lovenox. Monitor on telemetry. Echo. Troponin trend

## 2020-05-16 NOTE — H&P
[facility] Medicine  History & Physical    Patient Name: Cece Figueroa  MRN: [id number]  Admission Date: 5/16/2020  Attending Physician: Otoniel Berkowitz MD   Primary Care Provider: Janel Barajas MD         Patient information was obtained from patient, past medical records and ER records.     Subjective:     Principal Problem:Dehydration    Chief Complaint:   Chief Complaint   Patient presents with    Leg bruising     EMS reports pt family concerned she has a blood clot in her leg due to bruising in the right inner leg. hip surgery about 8 weeks ago        HPI: Cece Figueroa 89 y.o. female with no PMHx presents to the hospital with a chief complaint of leg swelling. The daughter reports she noticed a small area of left swelling behind her left knee and was concerned it was a DVT so she brought the patient to the hospital. She reports a gradual decline in mental status for the last 6 months with a gradual refusal to eat. She has attempted to supplement her meals with Boost, but the patient sometimes refuses. She is intermittently oriented to person and place, but sometimes her mental status is lessened beyond this. She has no medical history and takes no medications at home. She is largely bed bound at home. She has had a small pressure wound on her sacrum and ankle the family has been covering with hydrocolloid. Mckayla [phone] and her Brother who currently has power of  have both agreed for the patient to be DNR/DNI. She denies any history of her mother having Afib, but reports she would be ok with a blood thinner if necessary. The daughter personally has afib and is acceptable to Eliquis as blood thinner.     In the ED, Sodium 151, Calcium 13.9, BUN 39, troponin 0.045, CT with distending rectum and signs of possible stercoral colitis.     History reviewed. No pertinent past medical history.    Past Surgical History:   Procedure Laterality Date     HYSTERECTOMY      INTRAMEDULLARY RODDING OF FEMUR Right 3/30/2020    Procedure: INSERTION, INTRAMEDULLARY ARMANDO, FEMUR-HIP;  Surgeon: Armen Norwood MD;  Location: [facility];  Service: Orthopedics;  Laterality: Right;  JALEESA       Review of patient's allergies indicates:  No Known Allergies    No current facility-administered medications on file prior to encounter.      Current Outpatient Medications on File Prior to Encounter   Medication Sig    acetaminophen (TYLENOL) 160 mg/5 mL (5 mL) Susp Take by mouth.    multivitamin Liqd Take by mouth.    enoxaparin (LOVENOX) 40 mg/0.4 mL Syrg Inject 0.4 mLs (40 mg total) into the skin once daily.    HYDROcodone-acetaminophen (NORCO)  mg per tablet Take 1 tablet by mouth every 6 (six) hours as needed for Pain.     Family History     None        Tobacco Use    Smoking status: Never Smoker    Smokeless tobacco: Never Used   Substance and Sexual Activity    Alcohol use: Never     Frequency: Never     Drinks per session: Patient refused     Binge frequency: Patient refused    Drug use: Never    Sexual activity: Not on file     Review of Systems   Unable to perform ROS: Dementia     Objective:     Vital Signs (Most Recent):  Temp: 98.6 °F (37 °C) (05/16/20 1750)  Pulse: 105 (05/16/20 1731)  Resp: (!) 33 (05/16/20 1521)  BP: 139/62 (05/16/20 1731)  SpO2: 98 % (05/16/20 1731) Vital Signs (24h Range):  Temp:  [98.3 °F (36.8 °C)-98.6 °F (37 °C)] 98.6 °F (37 °C)  Pulse:  [] 105  Resp:  [16-33] 33  SpO2:  [94 %-99 %] 98 %  BP: (101-141)/(59-93) 139/62        There is no height or weight on file to calculate BMI.    Physical Exam   Constitutional: She appears well-developed. No distress.   thin   HENT:   Head: Normocephalic and atraumatic.   Right Ear: External ear normal.   Left Ear: External ear normal.   Eyes: Conjunctivae and EOM are normal. Right eye exhibits no discharge. Left eye exhibits no discharge.   Neck: Normal range of motion. No thyromegaly present.    Cardiovascular: Normal rate. An irregularly irregular rhythm present.   No murmur heard.  Pulmonary/Chest: Effort normal and breath sounds normal. No respiratory distress.   Abdominal: Soft. Bowel sounds are normal. She exhibits no distension and no mass. There is no tenderness.   Musculoskeletal: She exhibits no edema or deformity.   Small amount of erythema to left fibular head non-tender   Neurological: She is alert.   Oriented to person and place   Skin: Skin is warm and dry.   Psychiatric: She has a normal mood and affect. Her behavior is normal.   Nursing note and vitals reviewed.        CRANIAL NERVES     CN III, IV, VI   Extraocular motions are normal.        Significant Labs:   CBC:   Recent Labs   Lab 05/16/20  1247   WBC 18.01*   HGB 11.8*   HCT 39.0        CMP:   Recent Labs   Lab 05/16/20  1247   *   K 3.1*      CO2 29   *   BUN 39*   CREATININE 1.0   CALCIUM 13.5*   PROT 6.7   ALBUMIN 2.7*   BILITOT 1.0   ALKPHOS 108   AST 16   ALT 10   ANIONGAP 12   EGFRNONAA 49*     Coagulation:   Recent Labs   Lab 05/16/20  1247   INR 1.0   APTT 23.5     TSH:   Recent Labs   Lab 05/16/20  1247   TSH 3.173     Urine Studies:   Recent Labs   Lab 05/16/20  1234   COLORU Veda   APPEARANCEUA Clear   PHUR 5.0   SPECGRAV 1.025   PROTEINUA Negative   GLUCUA Negative   KETONESU Negative   BILIRUBINUA Negative   OCCULTUA Negative   NITRITE Negative   UROBILINOGEN 4.0-6.0*   LEUKOCYTESUR Negative   HYALINECASTS 10*       Significant Imaging:   Imaging Results          CT Abdomen Pelvis  Without Contrast (Final result)  Result time 05/16/20 14:53:47    Final result by Todd Ramsey MD (05/16/20 14:53:47)                 Impression:      Distension of the rectum with copious stool and surrounding inflammatory change indicating stercoral colitis.    Small right pleural effusion.  Nonspecific subpleural opacity in the right middle lobe and several small pulmonary nodules measuring up to 0.3 cm.   Recommend further evaluation with nonemergent noncontrast chest CT.    Age-indeterminate T12 vertebral body compression fracture.    Calcified atherosclerosis.    Additional findings above.      Electronically signed by: Todd Ramsey MD  Date:    05/16/2020  Time:    14:53             Narrative:    EXAMINATION:  CT ABDOMEN PELVIS WITHOUT CONTRAST    CLINICAL HISTORY:  Abdominal pain, unspecified;    TECHNIQUE:  Low dose axial images, sagittal and coronal reformations were obtained from the lung bases to the pubic symphysis.  Oral contrast was not administered.    COMPARISON:  None.    FINDINGS:  There is a small right pleural effusion.  No pericardial effusion.  There is nonspecific subpleural opacity in the right middle lobe, as well as several subpleural pulmonary nodules measuring up to 0.3 cm.    Liver, gallbladder, pancreas, spleen, and left adrenal gland are unremarkable.  There is a 1.5 cm right adrenal gland nodule in keeping with adenoma.  There is a 0.4 cm right renal cortical calcification. No contour deforming renal masses.  No hydronephrosis.    No retroperitoneal lymphadenopathy.  No ascites.  Abdominal aorta is normal caliber, noting calcified plaque.  There is extensive calcification of the splenic artery.    Small hiatal hernia noted.  There is copious stool within the rectum, which is distended to 8.1 cm.  There is surrounding edema in keeping with stercoral colitis.  Stool seen throughout the colon.  Appendix is not visualized.    Status post gamma nail fixation of right hip fracture, partially visualized.  There are degenerative changes of the lumbar spine.  There is age-indeterminate compression fracture of the T12 vertebral body.                               X-Ray Chest AP Portable (Final result)  Result time 05/16/20 13:18:19    Final result by Jermaine Hastings MD (05/16/20 13:18:19)                 Impression:      1. Allowing for positioning, interstitial findings may reflect chronic  change or interstitial edema, no convincing large focal consolidation.      Electronically signed by: Jermaine Hastings MD  Date:    05/16/2020  Time:    13:18             Narrative:    EXAMINATION:  XR CHEST AP PORTABLE    CLINICAL HISTORY:  chest pain;    TECHNIQUE:  Single frontal view of the chest was performed.    COMPARISON:  03/30/2020    FINDINGS:  Please note, patient positioning limits evaluation.  Patient is rotated.  The cardiomediastinal silhouette is not enlarged, noting calcification of the aorta.  There is elevation of the right hemidiaphragm..  There is no pleural effusion.  The trachea is midline.  The lungs are symmetrically expanded bilaterally with coarse interstitial attenuation bilaterally, nonspecific, could reflect sequela of chronic change or interstitial edema..  No convincing large focal consolidation allowing for patient positioning.  There is no pneumothorax.  The osseous structures are remarkable for degenerative changes noting osteopenia..                                  Assessment/Plan:     * Dehydration  Daughter reports poor PO intake for extensive period of time. Sodium today of 151, calcium of 13.5, and BUN of 39.   -Started on 1/2 NS with 20 of KCl  -repeat BMP  -neuro checks/seizure precautions    Advanced care planning/counseling discussion  Greater than 16 minutes spent discussing code status with Daughter Mckayla. She reports she and her brother discussed this and have decided for DNR/DNI. Her brother has power of  but is attempting to transfer this to her. DNR order placed in chart. Will consult palliative care as patient has had a gradual decline for the last 6 months and has begun to refuse to eat. Discussion witness by Akilah Rios-CHANTAL via telephone.    Elevated troponin  Troponin 0.045. No complaints of chest pain, and EKG of sinus. Will continue to trend    Sinus arrhythmia  Her EKG today appears to be sinus with PAC. Her rate has maintained over 100. Will start  low dose beta blocker. Place on DVT prophylaxis lovenox. Monitor on telemetry. Echo. Troponin trend    Hypercalcemia  Likely related to dehydration see above    Hypernatremia  Likely related to dehydration see above      VTE Risk Mitigation (From admission, onward)         Ordered     IP VTE HIGH RISK PATIENT  Once      05/16/20 1759     Place sequential compression device  Until discontinued      05/16/20 1759     enoxaparin injection 40 mg  Daily      05/16/20 1759              Addendum: DVT US returned positive to DVT. Will start full dose lovenox. Discussed with daughter previously she was acceptable to anticoagulation. She denies the patient having any recent bleeding.       VTE: lovenox  Code: full  Diet: clear liquid  Dispo: pending correction of electrolytes  Patient will be admitted to inpatient with hospital medicine.     Fernando Jesus PA-C  Department of Hospital Medicine   [facility]

## 2020-05-16 NOTE — HOSPITAL COURSE
Cece Figueroa 89 y.o. female was brought in by family secondary to leg swelling.  Patient was noted to be dehydrated on presentation and started on IVF's.  US does show acute deep venous thrombosis in the right common femoral vein and popliteal veins.  Started on therapeutic Lovenox.  Patient with decreasing oral intake and worsening functional status over past few months and exacerbated by recent hip fracture.  Palliative Care consulted.    05/19/2020  Continue to have worsening of the dysphagia  Prealbumin at very level of 6      5/20/2020  The patient was seen by [facility]  Recommends mechanical soft diet    5/21/2020  Pt with protein calorie malnutrition   IV fluid has corrected hypernatremia and hypercalcemia   Continue current care.

## 2020-05-16 NOTE — ASSESSMENT & PLAN NOTE
Daughter reports poor PO intake for extensive period of time. Sodium today of 151, calcium of 13.5, and BUN of 39.   -Started on 1/2 NS with 20 of KCl  -repeat BMP  -neuro checks/seizure precautions

## 2020-05-16 NOTE — ED NOTES
Patient's daughter Mckayla contacted for medical hx of patient and chief complaints. Contact number for Mckayla is [phone] and April [phone].

## 2020-05-16 NOTE — ED TRIAGE NOTES
The patient presents to the ED via  ems. The emt states that patient's family called because they noticed redness to right leg today. Patient previously had right hip surgery 6 weeks ago. Patient's daughter Mckayla states that there was a warm bump to right posterior upper leg. Patient daughter patient states that patient is having intermittent coughing spells and sob that started after surgery. Patient normally wears 2 L of O2 at home, per daughter.  Denies fevers, chills, nausea, vomiting, diarrhea. Patient also has dark urine x 1 week. Ems reports that patient's o2 sat was 91% on RA and is in Afib.

## 2020-05-16 NOTE — ED PROVIDER NOTES
Encounter Date: 5/16/2020    SCRIBE #1 NOTE: I, Joann Padilla, am scribing for, and in the presence of,  Lazarus Kruse MD. I have scribed the following portions of the note - Other sections scribed: HPI, ROS.       History     Chief Complaint   Patient presents with    Leg bruising     EMS reports pt family concerned she has a blood clot in her leg due to bruising in the right inner leg. hip surgery about 8 weeks ago     The patient presents to the ED via [facility] ems. The emt states that patient's family called because they noticed redness to right leg today. Patient previously had right hip surgery 6 weeks ago. Patient's daughter Mckayla states that there was a warm bump to right posterior upper leg. Patient daughter patient states that patient is having intermittent coughing spells and sob that started after surgery. Patient normally wears 2 L of O2 at home, per daughter.  Denies fevers, chills, vomiting, diarrhea. Patient also has dark urine x 1 week. Ems reports that patient's o2 sat was 91% on RA and is in Afib.     The history is provided by a relative and the EMS personnel. No  was used.     Review of patient's allergies indicates:  No Known Allergies  History reviewed. No pertinent past medical history.  Past Surgical History:   Procedure Laterality Date    HYSTERECTOMY      INTRAMEDULLARY RODDING OF FEMUR Right 3/30/2020    Procedure: INSERTION, INTRAMEDULLARY ARMANDO, FEMUR-HIP;  Surgeon: Armen Norwood MD;  [facility]: [facility];  Service: Orthopedics;  Laterality: Right;  JALEESA     History reviewed. No pertinent family history.  Social History     Tobacco Use    Smoking status: Never Smoker    Smokeless tobacco: Never Used   Substance Use Topics    Alcohol use: Never     Frequency: Never     Drinks per session: Patient refused     Binge frequency: Patient refused    Drug use: Never     Review of Systems   Constitutional: Negative for chills, diaphoresis and fever.   HENT: Negative for ear  pain and sore throat.    Eyes: Negative for pain, discharge, redness and itching.   Respiratory: Positive for cough and shortness of breath.    Cardiovascular: Negative for chest pain.   Gastrointestinal: Negative for abdominal pain, diarrhea and vomiting.   Genitourinary: Negative for dysuria.        (+) Dark Urine   Musculoskeletal: Negative for arthralgias, joint swelling and myalgias.   Skin: Positive for wound. Negative for rash.        (+) Bump   Neurological: Negative for headaches.       Physical Exam     Initial Vitals [05/16/20 1135]   BP Pulse Resp Temp SpO2   118/70 71 16 98.3 °F (36.8 °C) (!) 94 %      MAP       --         Physical Exam  The patient was examined specifically for the following:   General:No significant distress, Good color, Warm and dry. Head and neck:Scalp atraumatic, Neck supple. Neurological:Appropriate conversation, Gross motor deficits. Eyes:Conjugate gaze, Clear corneas. ENT: No epistaxis. Cardiac: Regular rate and rhythm, Grossly normal heart tones. Pulmonary: Wheezing, Rales. Gastrointestinal: Abdominal tenderness, Abdominal distention. Musculoskeletal: Extremity deformity, Apparent pain with range of motion of the joints. Skin: Rash.   The findings on examination were normal except for the following:  The patient seems confused.  She is alert and bright.  The scalp is atraumatic.  The neck is supple.  The lungs are clear and free of wheezing rales or rhonchi.  There is no evidence of respiratory distress.  The patient is not coughing during the physical examination.  Heart tones are remarkable for an irregular rhythm.  The abdomen is nontender.  The patient has a small amount of emesis on her hospital gown.  There is a small area of erythema over the fibular head on the right side.  There is a small amount of erythema on the skin of the right gluteal region.  There is no swelling or tenderness in the lower extremities.  Patient has minimal pain with range of motion of the right  and left hip.  Patient is afebrile.   ED Course   Procedures  Labs Reviewed   COMPREHENSIVE METABOLIC PANEL - Abnormal; Notable for the following components:       Result Value    Sodium 151 (*)     Potassium 3.1 (*)     Glucose 126 (*)     BUN, Bld 39 (*)     Calcium 13.5 (*)     Albumin 2.7 (*)     eGFR if  57 (*)     eGFR if non  49 (*)     All other components within normal limits    Narrative:     CALCIUM critical result(s) called and verbal readback obtained from   [place] [facility] 05/16/2020 13:55   CBC W/ AUTO DIFFERENTIAL - Abnormal; Notable for the following components:    WBC 18.01 (*)     Hemoglobin 11.8 (*)     Mean Corpuscular Hemoglobin Conc 30.3 (*)     RDW 17.2 (*)     Immature Granulocytes 0.7 (*)     Gran # (ANC) 15.4 (*)     Immature Grans (Abs) 0.13 (*)     Mono # 1.2 (*)     Gran% 85.4 (*)     Lymph% 7.1 (*)     All other components within normal limits   TROPONIN I - Abnormal; Notable for the following components:    Troponin I 0.045 (*)     All other components within normal limits   MAGNESIUM - Abnormal; Notable for the following components:    Magnesium 2.7 (*)     All other components within normal limits   URINALYSIS, REFLEX TO URINE CULTURE - Abnormal; Notable for the following components:    Urobilinogen, UA 4.0-6.0 (*)     All other components within normal limits    Narrative:     Preferred Collection Type->Urine, Clean Catch   URINALYSIS MICROSCOPIC - Abnormal; Notable for the following components:    Hyaline Casts, UA 10 (*)     All other components within normal limits    Narrative:     Preferred Collection Type->Urine, Clean Catch   APTT   PROTIME-INR   TSH     EKG Readings: (Independently Interpreted)   This patient is actually in a sinus rhythm with a heart rate of 115.  There places with this looks like atrial fibrillation but V1 definitely reveals P-waves per for every QRS complex.  This is a poor quality EKG the QRS complexes are small and  the baseline is choppy.        Imaging Results          CT Abdomen Pelvis  Without Contrast (Final result)  Result time 05/16/20 14:53:47    Final result by Todd Ramsey MD (05/16/20 14:53:47)                 Impression:      Distension of the rectum with copious stool and surrounding inflammatory change indicating stercoral colitis.    Small right pleural effusion.  Nonspecific subpleural opacity in the right middle lobe and several small pulmonary nodules measuring up to 0.3 cm.  Recommend further evaluation with nonemergent noncontrast chest CT.    Age-indeterminate T12 vertebral body compression fracture.    Calcified atherosclerosis.    Additional findings above.      Electronically signed by: Todd Ramsey MD  Date:    05/16/2020  Time:    14:53             Narrative:    EXAMINATION:  CT ABDOMEN PELVIS WITHOUT CONTRAST    CLINICAL HISTORY:  Abdominal pain, unspecified;    TECHNIQUE:  Low dose axial images, sagittal and coronal reformations were obtained from the lung bases to the pubic symphysis.  Oral contrast was not administered.    COMPARISON:  None.    FINDINGS:  There is a small right pleural effusion.  No pericardial effusion.  There is nonspecific subpleural opacity in the right middle lobe, as well as several subpleural pulmonary nodules measuring up to 0.3 cm.    Liver, gallbladder, pancreas, spleen, and left adrenal gland are unremarkable.  There is a 1.5 cm right adrenal gland nodule in keeping with adenoma.  There is a 0.4 cm right renal cortical calcification. No contour deforming renal masses.  No hydronephrosis.    No retroperitoneal lymphadenopathy.  No ascites.  Abdominal aorta is normal caliber, noting calcified plaque.  There is extensive calcification of the splenic artery.    Small hiatal hernia noted.  There is copious stool within the rectum, which is distended to 8.1 cm.  There is surrounding edema in keeping with stercoral colitis.  Stool seen throughout the colon.  Appendix is not  visualized.    Status post gamma nail fixation of right hip fracture, partially visualized.  There are degenerative changes of the lumbar spine.  There is age-indeterminate compression fracture of the T12 vertebral body.                               X-Ray Chest AP Portable (Final result)  Result time 05/16/20 13:18:19    Final result by Jermaine Hastings MD (05/16/20 13:18:19)                 Impression:      1. Allowing for positioning, interstitial findings may reflect chronic change or interstitial edema, no convincing large focal consolidation.      Electronically signed by: Jermaine Hastings MD  Date:    05/16/2020  Time:    13:18             Narrative:    EXAMINATION:  XR CHEST AP PORTABLE    CLINICAL HISTORY:  chest pain;    TECHNIQUE:  Single frontal view of the chest was performed.    COMPARISON:  03/30/2020    FINDINGS:  Please note, patient positioning limits evaluation.  Patient is rotated.  The cardiomediastinal silhouette is not enlarged, noting calcification of the aorta.  There is elevation of the right hemidiaphragm..  There is no pleural effusion.  The trachea is midline.  The lungs are symmetrically expanded bilaterally with coarse interstitial attenuation bilaterally, nonspecific, could reflect sequela of chronic change or interstitial edema..  No convincing large focal consolidation allowing for patient positioning.  There is no pneumothorax.  The osseous structures are remarkable for degenerative changes noting osteopenia..                              Medical decision making:  Given the above this patient presents to the emergency room with confusion which is chronic and reports some erythematous swollen red tender area on the right thigh.  No significant areas of erythema warmth edema fluctuance induration were identified on the right lower extremity.  The patient had surgery on this extremity for femoral kim 2 months ago.  Surgical scars are well healed.  The pelvis is stable.  There is no  leg swelling I doubt deep venous thrombosis cellulitis and abscess.  There was some minimal erythema around the fibular head and the right gluteal region.  The patient was found have an 18,000 white count.  The chest x-ray and urinalysis were negative CT of the abdomen revealed a stool filled colon.  Stay door coral colitis is considered.  I did a rectal examination and the colon was filled with soft stool.  I will admit the patient for hydration.  The patient's BUN sodium are elevated.  There are elevated above baseline.  The patient is an elevated calcium of 13.5.  Troponin is slightly elevated.  I will trended.  I discussed the case with Dr. Berkowitz.  He agreed to treat the patient with fluids.  CT of the abdomen failed to reveal bowel obstruction.                Scribe Attestation:   Scribe #1: I performed the above scribed service and the documentation accurately describes the services I performed. I attest to the accuracy of the note.                          Clinical Impression:       ICD-10-CM ICD-9-CM   1. Dehydration E86.0 276.51   2. Vomiting R11.10 787.03   3. Hypercalcemia E83.52 275.42   4. 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  All medical record  entries made by the scribe are at my direction and in my presence.  Signed, Dr. Uriah Kruse MD  05/16/20 2158

## 2020-05-16 NOTE — HPI
Cece Figueroa 89 y.o. female with no PMHx presents to the hospital with a chief complaint of leg swelling. The daughter reports she noticed a small area of left swelling behind her left knee and was concerned it was a DVT so she brought the patient to the hospital. She reports a gradual decline in mental status for the last 6 months with a gradual refusal to eat. She has attempted to supplement her meals with Boost, but the patient sometimes refuses. She is intermittently oriented to person and place, but sometimes her mental status is lessened beyond this. She has no medical history and takes no medications at home. She is largely bed bound at home. She has had a small pressure wound on her sacrum and ankle the family has been covering with hydrocolloid. Mckayla [phone] and her Brother who currently has power of  have both agreed for the patient to be DNR/DNI. She denies any history of her mother having Afib, but reports she would be ok with a blood thinner if necessary. The daughter personally has afib and is acceptable to Eliquis as blood thinner.     In the ED, Sodium 151, Calcium 13.9, BUN 39, troponin 0.045, CT with distending rectum and signs of possible stercoral colitis.

## 2020-05-16 NOTE — ED NOTES
Placed patient on tele transport monitor, called and verified with Monica that she was picking up the monitor.

## 2020-05-16 NOTE — SUBJECTIVE & OBJECTIVE
History reviewed. No pertinent past medical history.    Past Surgical History:   Procedure Laterality Date    HYSTERECTOMY      INTRAMEDULLARY RODDING OF FEMUR Right 3/30/2020    Procedure: INSERTION, INTRAMEDULLARY ARMANDO, FEMUR-HIP;  Surgeon: Armen Norwood MD;  Location: [facility];  Service: Orthopedics;  Laterality: Right;  JALEESA       Review of patient's allergies indicates:  No Known Allergies    No current facility-administered medications on file prior to encounter.      Current Outpatient Medications on File Prior to Encounter   Medication Sig    acetaminophen (TYLENOL) 160 mg/5 mL (5 mL) Susp Take by mouth.    multivitamin Liqd Take by mouth.    enoxaparin (LOVENOX) 40 mg/0.4 mL Syrg Inject 0.4 mLs (40 mg total) into the skin once daily.    HYDROcodone-acetaminophen (NORCO)  mg per tablet Take 1 tablet by mouth every 6 (six) hours as needed for Pain.     Family History     None        Tobacco Use    Smoking status: Never Smoker    Smokeless tobacco: Never Used   Substance and Sexual Activity    Alcohol use: Never     Frequency: Never     Drinks per session: Patient refused     Binge frequency: Patient refused    Drug use: Never    Sexual activity: Not on file     Review of Systems   Unable to perform ROS: Dementia     Objective:     Vital Signs (Most Recent):  Temp: 98.6 °F (37 °C) (05/16/20 1750)  Pulse: 105 (05/16/20 1731)  Resp: (!) 33 (05/16/20 1521)  BP: 139/62 (05/16/20 1731)  SpO2: 98 % (05/16/20 1731) Vital Signs (24h Range):  Temp:  [98.3 °F (36.8 °C)-98.6 °F (37 °C)] 98.6 °F (37 °C)  Pulse:  [] 105  Resp:  [16-33] 33  SpO2:  [94 %-99 %] 98 %  BP: (101-141)/(59-93) 139/62        There is no height or weight on file to calculate BMI.    Physical Exam   Constitutional: She appears well-developed. No distress.   thin   HENT:   Head: Normocephalic and atraumatic.   Right Ear: External ear normal.   Left Ear: External ear normal.   Eyes: Conjunctivae and EOM are normal. Right eye  exhibits no discharge. Left eye exhibits no discharge.   Neck: Normal range of motion. No thyromegaly present.   Cardiovascular: Normal rate. An irregularly irregular rhythm present.   No murmur heard.  Pulmonary/Chest: Effort normal and breath sounds normal. No respiratory distress.   Abdominal: Soft. Bowel sounds are normal. She exhibits no distension and no mass. There is no tenderness.   Musculoskeletal: She exhibits no edema or deformity.   Small amount of erythema to left fibular head non-tender   Neurological: She is alert.   Oriented to person and place   Skin: Skin is warm and dry.   Psychiatric: She has a normal mood and affect. Her behavior is normal.   Nursing note and vitals reviewed.        CRANIAL NERVES     CN III, IV, VI   Extraocular motions are normal.        Significant Labs:   CBC:   Recent Labs   Lab 05/16/20  1247   WBC 18.01*   HGB 11.8*   HCT 39.0        CMP:   Recent Labs   Lab 05/16/20  1247   *   K 3.1*      CO2 29   *   BUN 39*   CREATININE 1.0   CALCIUM 13.5*   PROT 6.7   ALBUMIN 2.7*   BILITOT 1.0   ALKPHOS 108   AST 16   ALT 10   ANIONGAP 12   EGFRNONAA 49*     Coagulation:   Recent Labs   Lab 05/16/20  1247   INR 1.0   APTT 23.5     TSH:   Recent Labs   Lab 05/16/20  1247   TSH 3.173     Urine Studies:   Recent Labs   Lab 05/16/20  1234   COLORU Veda   APPEARANCEUA Clear   PHUR 5.0   SPECGRAV 1.025   PROTEINUA Negative   GLUCUA Negative   KETONESU Negative   BILIRUBINUA Negative   OCCULTUA Negative   NITRITE Negative   UROBILINOGEN 4.0-6.0*   LEUKOCYTESUR Negative   HYALINECASTS 10*       Significant Imaging:   Imaging Results          CT Abdomen Pelvis  Without Contrast (Final result)  Result time 05/16/20 14:53:47    Final result by Todd Ramsey MD (05/16/20 14:53:47)                 Impression:      Distension of the rectum with copious stool and surrounding inflammatory change indicating stercoral colitis.    Small right pleural effusion.  Nonspecific  subpleural opacity in the right middle lobe and several small pulmonary nodules measuring up to 0.3 cm.  Recommend further evaluation with nonemergent noncontrast chest CT.    Age-indeterminate T12 vertebral body compression fracture.    Calcified atherosclerosis.    Additional findings above.      Electronically signed by: Todd Ramsey MD  Date:    05/16/2020  Time:    14:53             Narrative:    EXAMINATION:  CT ABDOMEN PELVIS WITHOUT CONTRAST    CLINICAL HISTORY:  Abdominal pain, unspecified;    TECHNIQUE:  Low dose axial images, sagittal and coronal reformations were obtained from the lung bases to the pubic symphysis.  Oral contrast was not administered.    COMPARISON:  None.    FINDINGS:  There is a small right pleural effusion.  No pericardial effusion.  There is nonspecific subpleural opacity in the right middle lobe, as well as several subpleural pulmonary nodules measuring up to 0.3 cm.    Liver, gallbladder, pancreas, spleen, and left adrenal gland are unremarkable.  There is a 1.5 cm right adrenal gland nodule in keeping with adenoma.  There is a 0.4 cm right renal cortical calcification. No contour deforming renal masses.  No hydronephrosis.    No retroperitoneal lymphadenopathy.  No ascites.  Abdominal aorta is normal caliber, noting calcified plaque.  There is extensive calcification of the splenic artery.    Small hiatal hernia noted.  There is copious stool within the rectum, which is distended to 8.1 cm.  There is surrounding edema in keeping with stercoral colitis.  Stool seen throughout the colon.  Appendix is not visualized.    Status post gamma nail fixation of right hip fracture, partially visualized.  There are degenerative changes of the lumbar spine.  There is age-indeterminate compression fracture of the T12 vertebral body.                               X-Ray Chest AP Portable (Final result)  Result time 05/16/20 13:18:19    Final result by Jermaine Hastings MD (05/16/20 13:18:19)                  Impression:      1. Allowing for positioning, interstitial findings may reflect chronic change or interstitial edema, no convincing large focal consolidation.      Electronically signed by: Jermaine Hastings MD  Date:    05/16/2020  Time:    13:18             Narrative:    EXAMINATION:  XR CHEST AP PORTABLE    CLINICAL HISTORY:  chest pain;    TECHNIQUE:  Single frontal view of the chest was performed.    COMPARISON:  03/30/2020    FINDINGS:  Please note, patient positioning limits evaluation.  Patient is rotated.  The cardiomediastinal silhouette is not enlarged, noting calcification of the aorta.  There is elevation of the right hemidiaphragm..  There is no pleural effusion.  The trachea is midline.  The lungs are symmetrically expanded bilaterally with coarse interstitial attenuation bilaterally, nonspecific, could reflect sequela of chronic change or interstitial edema..  No convincing large focal consolidation allowing for patient positioning.  There is no pneumothorax.  The osseous structures are remarkable for degenerative changes noting osteopenia..

## 2020-05-16 NOTE — ASSESSMENT & PLAN NOTE
Greater than 16 minutes spent discussing code status with Daughter Mckayla. She reports she and her brother discussed this and have decided for DNR/DNI. Her brother has power of  but is attempting to transfer this to her. DNR order placed in chart. Will consult palliative care as patient has had a gradual decline for the last 6 months and has begun to refuse to eat. Discussion witness by Akilah Rios-CHANTAL via telephone.

## 2020-05-17 LAB
ANION GAP SERPL CALC-SCNC: 10 MMOL/L (ref 8–16)
ANION GAP SERPL CALC-SCNC: 11 MMOL/L (ref 8–16)
ANION GAP SERPL CALC-SCNC: 8 MMOL/L (ref 8–16)
ANION GAP SERPL CALC-SCNC: 9 MMOL/L (ref 8–16)
BASOPHILS # BLD AUTO: 0.03 K/UL (ref 0–0.2)
BASOPHILS NFR BLD: 0.2 % (ref 0–1.9)
BUN SERPL-MCNC: 30 MG/DL (ref 10–30)
BUN SERPL-MCNC: 32 MG/DL (ref 10–30)
BUN SERPL-MCNC: 33 MG/DL (ref 10–30)
BUN SERPL-MCNC: 35 MG/DL (ref 10–30)
CALCIUM SERPL-MCNC: 12.2 MG/DL (ref 8.7–10.5)
CALCIUM SERPL-MCNC: 12.3 MG/DL (ref 8.7–10.5)
CALCIUM SERPL-MCNC: 12.4 MG/DL (ref 8.7–10.5)
CALCIUM SERPL-MCNC: 12.5 MG/DL (ref 8.7–10.5)
CHLORIDE SERPL-SCNC: 117 MMOL/L (ref 95–110)
CHLORIDE SERPL-SCNC: 118 MMOL/L (ref 95–110)
CO2 SERPL-SCNC: 22 MMOL/L (ref 23–29)
CO2 SERPL-SCNC: 23 MMOL/L (ref 23–29)
CO2 SERPL-SCNC: 23 MMOL/L (ref 23–29)
CO2 SERPL-SCNC: 25 MMOL/L (ref 23–29)
CREAT SERPL-MCNC: 0.7 MG/DL (ref 0.5–1.4)
CREAT SERPL-MCNC: 0.8 MG/DL (ref 0.5–1.4)
DIFFERENTIAL METHOD: ABNORMAL
EOSINOPHIL # BLD AUTO: 0 K/UL (ref 0–0.5)
EOSINOPHIL NFR BLD: 0.1 % (ref 0–8)
ERYTHROCYTE [DISTWIDTH] IN BLOOD BY AUTOMATED COUNT: 16.9 % (ref 11.5–14.5)
EST. GFR  (AFRICAN AMERICAN): >60 ML/MIN/1.73 M^2
EST. GFR  (NON AFRICAN AMERICAN): >60 ML/MIN/1.73 M^2
GLUCOSE SERPL-MCNC: 104 MG/DL (ref 70–110)
GLUCOSE SERPL-MCNC: 80 MG/DL (ref 70–110)
GLUCOSE SERPL-MCNC: 84 MG/DL (ref 70–110)
GLUCOSE SERPL-MCNC: 92 MG/DL (ref 70–110)
HCT VFR BLD AUTO: 37.2 % (ref 37–48.5)
HGB BLD-MCNC: 11 G/DL (ref 12–16)
IMM GRANULOCYTES # BLD AUTO: 0.2 K/UL (ref 0–0.04)
IMM GRANULOCYTES NFR BLD AUTO: 1.1 % (ref 0–0.5)
LYMPHOCYTES # BLD AUTO: 1.6 K/UL (ref 1–4.8)
LYMPHOCYTES NFR BLD: 8.7 % (ref 18–48)
MCH RBC QN AUTO: 27.8 PG (ref 27–31)
MCHC RBC AUTO-ENTMCNC: 29.6 G/DL (ref 32–36)
MCV RBC AUTO: 94 FL (ref 82–98)
MONOCYTES # BLD AUTO: 1.3 K/UL (ref 0.3–1)
MONOCYTES NFR BLD: 7.1 % (ref 4–15)
NEUTROPHILS # BLD AUTO: 15.4 K/UL (ref 1.8–7.7)
NEUTROPHILS NFR BLD: 82.8 % (ref 38–73)
NRBC BLD-RTO: 0 /100 WBC
PLATELET # BLD AUTO: 246 K/UL (ref 150–350)
PMV BLD AUTO: 11.4 FL (ref 9.2–12.9)
POTASSIUM SERPL-SCNC: 3.3 MMOL/L (ref 3.5–5.1)
POTASSIUM SERPL-SCNC: 3.4 MMOL/L (ref 3.5–5.1)
POTASSIUM SERPL-SCNC: 3.4 MMOL/L (ref 3.5–5.1)
POTASSIUM SERPL-SCNC: 4 MMOL/L (ref 3.5–5.1)
RBC # BLD AUTO: 3.95 M/UL (ref 4–5.4)
SODIUM SERPL-SCNC: 149 MMOL/L (ref 136–145)
SODIUM SERPL-SCNC: 150 MMOL/L (ref 136–145)
SODIUM SERPL-SCNC: 150 MMOL/L (ref 136–145)
SODIUM SERPL-SCNC: 151 MMOL/L (ref 136–145)
TROPONIN I SERPL DL<=0.01 NG/ML-MCNC: 0.05 NG/ML (ref 0–0.03)
WBC # BLD AUTO: 18.54 K/UL (ref 3.9–12.7)

## 2020-05-17 PROCEDURE — 25000003 PHARM REV CODE 250: Performed by: PHYSICIAN ASSISTANT

## 2020-05-17 PROCEDURE — 63600175 PHARM REV CODE 636 W HCPCS: Performed by: HOSPITALIST

## 2020-05-17 PROCEDURE — 36415 COLL VENOUS BLD VENIPUNCTURE: CPT

## 2020-05-17 PROCEDURE — 25000003 PHARM REV CODE 250: Performed by: FAMILY MEDICINE

## 2020-05-17 PROCEDURE — 80048 BASIC METABOLIC PNL TOTAL CA: CPT

## 2020-05-17 PROCEDURE — 85025 COMPLETE CBC W/AUTO DIFF WBC: CPT

## 2020-05-17 PROCEDURE — 25000003 PHARM REV CODE 250: Performed by: HOSPITALIST

## 2020-05-17 PROCEDURE — 11000001 HC ACUTE MED/SURG PRIVATE ROOM

## 2020-05-17 RX ORDER — DEXTROSE MONOHYDRATE, SODIUM CHLORIDE, AND POTASSIUM CHLORIDE 50; 1.49; 4.5 G/1000ML; G/1000ML; G/1000ML
INJECTION, SOLUTION INTRAVENOUS CONTINUOUS
Status: DISCONTINUED | OUTPATIENT
Start: 2020-05-17 | End: 2020-05-22 | Stop reason: HOSPADM

## 2020-05-17 RX ORDER — ENOXAPARIN SODIUM 100 MG/ML
1 INJECTION SUBCUTANEOUS
Status: DISCONTINUED | OUTPATIENT
Start: 2020-05-17 | End: 2020-05-22

## 2020-05-17 RX ORDER — POLYETHYLENE GLYCOL 3350 17 G/17G
17 POWDER, FOR SOLUTION ORAL 2 TIMES DAILY PRN
Status: DISCONTINUED | OUTPATIENT
Start: 2020-05-17 | End: 2020-05-22 | Stop reason: HOSPADM

## 2020-05-17 RX ORDER — ONDANSETRON 2 MG/ML
8 INJECTION INTRAMUSCULAR; INTRAVENOUS EVERY 6 HOURS PRN
Status: DISCONTINUED | OUTPATIENT
Start: 2020-05-17 | End: 2020-05-22 | Stop reason: HOSPADM

## 2020-05-17 RX ORDER — ACETAMINOPHEN 325 MG/1
650 TABLET ORAL EVERY 4 HOURS PRN
Status: DISCONTINUED | OUTPATIENT
Start: 2020-05-17 | End: 2020-05-22 | Stop reason: HOSPADM

## 2020-05-17 RX ADMIN — METOPROLOL TARTRATE 12.5 MG: 25 TABLET, FILM COATED ORAL at 09:05

## 2020-05-17 RX ADMIN — DEXTROSE, SODIUM CHLORIDE, AND POTASSIUM CHLORIDE: 5; .45; .15 INJECTION INTRAVENOUS at 03:05

## 2020-05-17 RX ADMIN — ENOXAPARIN SODIUM 60 MG: 60 INJECTION SUBCUTANEOUS at 11:05

## 2020-05-17 RX ADMIN — SODIUM CHLORIDE 500 ML: 9 INJECTION, SOLUTION INTRAVENOUS at 01:05

## 2020-05-17 RX ADMIN — METOPROLOL TARTRATE 12.5 MG: 25 TABLET, FILM COATED ORAL at 08:05

## 2020-05-17 NOTE — ASSESSMENT & PLAN NOTE
Discussed code status with Daughter Mckayla. She reports she and her brother discussed this and have decided for DNR/DNI. Her brother has power of  but is attempting to transfer this to her. DNR order placed in chart. Will consult palliative care as patient has had a gradual decline for the last 6 months and has begun to refuse to eat.

## 2020-05-17 NOTE — ASSESSMENT & PLAN NOTE
Daughter reported poor PO intake for extensive period of time.  Hypernatremia and Hypercalcemia on presentation.  -Started on 1/2 NS with 20 of KCl  No oral intake.  Change fluids to D5 1/2 NS  Monitor BMP  -neuro checks/seizure precautions

## 2020-05-17 NOTE — PROGRESS NOTES
[facility] Hospital Medicine  Progress Note    Patient Name: Cece Figueroa  MRN: [id number]  Patient Class: IP- Inpatient   Admission Date: 5/16/2020  Length of Stay: 1 days  Attending Physician: Otoniel Berkowitz MD  Primary Care Provider: Janel Barajas MD        Subjective:     Principal Problem:Dehydration        HPI:  Cece Figueroa 89 y.o. female with no PMHx presents to the hospital with a chief complaint of leg swelling. The daughter reports she noticed a small area of left swelling behind her left knee and was concerned it was a DVT so she brought the patient to the hospital. She reports a gradual decline in mental status for the last 6 months with a gradual refusal to eat. She has attempted to supplement her meals with Boost, but the patient sometimes refuses. She is intermittently oriented to person and place, but sometimes her mental status is lessened beyond this. She has no medical history and takes no medications at home. She is largely bed bound at home. She has had a small pressure wound on her sacrum and ankle the family has been covering with hydrocolloid. Mckayla [phone] and her Brother who currently has power of  have both agreed for the patient to be DNR/DNI. She denies any history of her mother having Afib, but reports she would be ok with a blood thinner if necessary. The daughter personally has afib and is acceptable to Eliquis as blood thinner.     In the ED, Sodium 151, Calcium 13.9, BUN 39, troponin 0.045, CT with distending rectum and signs of possible stercoral colitis.     Overview/Hospital Course:  Cece Figuerao 89 y.o. female was brought in by family secondary to leg swelling.  Patient was noted to be dehydrated on presentation and started on IVF's.  US does show acute deep venous thrombosis in the right common femoral vein and popliteal veins.  Started on therapeutic Lovenox.    Interval History: Very confused.     Review  of Systems   Unable to perform ROS: Mental status change     Objective:     Vital Signs (Most Recent):  Temp: 97.3 °F (36.3 °C) (05/17/20 1115)  Pulse: 95 (05/17/20 1115)  Resp: 16 (05/17/20 1115)  BP: 136/66 (05/17/20 1115)  SpO2: (!) 93 % (05/17/20 1115) Vital Signs (24h Range):  Temp:  [96.2 °F (35.7 °C)-98.6 °F (37 °C)] 97.3 °F (36.3 °C)  Pulse:  [] 95  Resp:  [16-33] 16  SpO2:  [90 %-99 %] 93 %  BP: (122-141)/(58-93) 136/66     Weight: 58.5 kg (129 lb)  Body mass index is 23.59 kg/m².    Intake/Output Summary (Last 24 hours) at 5/17/2020 1429  Last data filed at 5/17/2020 1255  Gross per 24 hour   Intake 120 ml   Output --   Net 120 ml      Physical Exam   Constitutional: She appears well-developed. No distress.   thin   HENT:   Head: Normocephalic and atraumatic.   Right Ear: External ear normal.   Left Ear: External ear normal.   Eyes: Conjunctivae and EOM are normal. Right eye exhibits no discharge. Left eye exhibits no discharge.   Neck: Normal range of motion. No thyromegaly present.   Cardiovascular: Normal rate and regular rhythm.   No murmur heard.  Pulmonary/Chest: Effort normal and breath sounds normal. No respiratory distress.   Abdominal: Soft. Bowel sounds are normal. She exhibits no distension and no mass. There is no tenderness.   Musculoskeletal: She exhibits no edema or deformity.   Small amount of erythema to left fibular head non-tender   Neurological: She is alert.   Oriented to person and place   Skin: Skin is warm and dry.   Psychiatric: She has a normal mood and affect. Her behavior is normal.   Nursing note and vitals reviewed.      Significant Labs:   BMP:   Recent Labs   Lab 05/16/20  1247  05/17/20  1146   *   < > 80   *   < > 151*   K 3.1*   < > 3.4*      < > 117*   CO2 29   < > 23   BUN 39*   < > 32*   CREATININE 1.0   < > 0.8   CALCIUM 13.5*   < > 12.5*   MG 2.7*  --   --     < > = values in this interval not displayed.     CBC:   Recent Labs   Lab  05/16/20  1247 05/17/20  0527   WBC 18.01* 18.54*   HGB 11.8* 11.0*   HCT 39.0 37.2    246       Significant Imaging: I have reviewed all pertinent imaging results/findings within the past 24 hours.      Assessment/Plan:      * Dehydration  Daughter reported poor PO intake for extensive period of time.  Hypernatremia and Hypercalcemia on presentation.  -Started on 1/2 NS with 20 of KCl  No oral intake.  Change fluids to D5 1/2 NS  Monitor BMP  -neuro checks/seizure precautions    Acute deep vein thrombosis (DVT) of femoral vein of right lower extremity  Continue full dose Lovenox.    Advanced care planning/counseling discussion  Discussed code status with Daughter Mckayla. She reports she and her brother discussed this and have decided for DNR/DNI. Her brother has power of  but is attempting to transfer this to her. DNR order placed in chart. Will consult palliative care as patient has had a gradual decline for the last 6 months and has begun to refuse to eat.     Elevated troponin  Troponin 0.045. No complaints of chest pain, and EKG of sinus.   Probable demand ischemia.  Would not pursue further work up.    Sinus arrhythmia  Her EKG today appears to be sinus with PAC. Her rate has maintained over 100. Will start low dose beta blocker. Place on DVT prophylaxis lovenox. Monitor on telemetry. Echo. Troponin trend    Hypercalcemia  Likely related to dehydration see above    Hypernatremia  Likely related to dehydration see above      VTE Risk Mitigation (From admission, onward)         Ordered     enoxaparin injection 60 mg  Every 12 hours (non-standard times)      05/17/20 0924     IP VTE HIGH RISK PATIENT  Once      05/16/20 1759     Place sequential compression device  Until discontinued      05/16/20 1759                      Otoniel Berkowitz MD  Department of Hospital Medicine   [facility]

## 2020-05-17 NOTE — SUBJECTIVE & OBJECTIVE
Interval History: Very confused.     Review of Systems   Unable to perform ROS: Mental status change     Objective:     Vital Signs (Most Recent):  Temp: 97.3 °F (36.3 °C) (05/17/20 1115)  Pulse: 95 (05/17/20 1115)  Resp: 16 (05/17/20 1115)  BP: 136/66 (05/17/20 1115)  SpO2: (!) 93 % (05/17/20 1115) Vital Signs (24h Range):  Temp:  [96.2 °F (35.7 °C)-98.6 °F (37 °C)] 97.3 °F (36.3 °C)  Pulse:  [] 95  Resp:  [16-33] 16  SpO2:  [90 %-99 %] 93 %  BP: (122-141)/(58-93) 136/66     Weight: 58.5 kg (129 lb)  Body mass index is 23.59 kg/m².    Intake/Output Summary (Last 24 hours) at 5/17/2020 1429  Last data filed at 5/17/2020 1255  Gross per 24 hour   Intake 120 ml   Output --   Net 120 ml      Physical Exam   Constitutional: She appears well-developed. No distress.   thin   HENT:   Head: Normocephalic and atraumatic.   Right Ear: External ear normal.   Left Ear: External ear normal.   Eyes: Conjunctivae and EOM are normal. Right eye exhibits no discharge. Left eye exhibits no discharge.   Neck: Normal range of motion. No thyromegaly present.   Cardiovascular: Normal rate and regular rhythm.   No murmur heard.  Pulmonary/Chest: Effort normal and breath sounds normal. No respiratory distress.   Abdominal: Soft. Bowel sounds are normal. She exhibits no distension and no mass. There is no tenderness.   Musculoskeletal: She exhibits no edema or deformity.   Small amount of erythema to left fibular head non-tender   Neurological: She is alert.   Oriented to person and place   Skin: Skin is warm and dry.   Psychiatric: She has a normal mood and affect. Her behavior is normal.   Nursing note and vitals reviewed.      Significant Labs:   BMP:   Recent Labs   Lab 05/16/20  1247  05/17/20  1146   *   < > 80   *   < > 151*   K 3.1*   < > 3.4*      < > 117*   CO2 29   < > 23   BUN 39*   < > 32*   CREATININE 1.0   < > 0.8   CALCIUM 13.5*   < > 12.5*   MG 2.7*  --   --     < > = values in this interval not  displayed.     CBC:   Recent Labs   Lab 05/16/20  1247 05/17/20  0527   WBC 18.01* 18.54*   HGB 11.8* 11.0*   HCT 39.0 37.2    246       Significant Imaging: I have reviewed all pertinent imaging results/findings within the past 24 hours.

## 2020-05-17 NOTE — CONSULTS
Consulted for sacral pressure injury.  Cece Figueroa 89 y.o. Female admitted on 05/16/2020 with no PMHx presents to the hospital with a chief complaint of leg swelling. Patient lives at home with daughter and is bed bound.  5/17 WBC 18.54, H&H 11.0/37.2, Albumin 2.7, Platelets 246, INR 1.0  Risk factors- incontinence, dependent bed mobility.  Assessment- stage II to sacral area-pink. No drainage noted.  Recommendations- Cleanse with normal saline and pat dry. Apply Hydrocolloid dressing. Change dressing every 3 days or PRN soiled. Bilateral heel boots on at all times, turn/reposition Q 2hrs with turning wedge.

## 2020-05-17 NOTE — PLAN OF CARE
Pt AAOx1 (Oriented to self only), VSS, cardiac monitoring, voids per brief, scheduled meds administered, remains free of fall.    Problem: Fall Injury Risk  Goal: Absence of Fall and Fall-Related Injury  Outcome: Ongoing, Progressing  Intervention: Identify and Manage Contributors to Fall Injury Risk  Flowsheets (Taken 5/17/2020 9131)  Self-Care Promotion: BADL personal routines maintained  Medication Review/Management: medications reviewed

## 2020-05-17 NOTE — ASSESSMENT & PLAN NOTE
Troponin 0.045. No complaints of chest pain, and EKG of sinus.   Probable demand ischemia.  Would not pursue further work up.

## 2020-05-18 LAB
ANION GAP SERPL CALC-SCNC: 7 MMOL/L (ref 8–16)
AORTIC ROOT ANNULUS: 4.2 CM
AORTIC VALVE CUSP SEPERATION: 2.29 CM
ASCENDING AORTA: 3.48 CM
AV INDEX (PROSTH): 0.83
AV MEAN GRADIENT: 3 MMHG
AV PEAK GRADIENT: 5 MMHG
AV VALVE AREA: 2.52 CM2
AV VELOCITY RATIO: 0.84
BASOPHILS # BLD AUTO: 0.02 K/UL (ref 0–0.2)
BASOPHILS NFR BLD: 0.1 % (ref 0–1.9)
BSA FOR ECHO PROCEDURE: 1.6 M2
BUN SERPL-MCNC: 27 MG/DL (ref 10–30)
CALCIUM SERPL-MCNC: 11.9 MG/DL (ref 8.7–10.5)
CHLORIDE SERPL-SCNC: 115 MMOL/L (ref 95–110)
CO2 SERPL-SCNC: 25 MMOL/L (ref 23–29)
CREAT SERPL-MCNC: 0.7 MG/DL (ref 0.5–1.4)
CV ECHO LV RWT: 0.74 CM
DIFFERENTIAL METHOD: ABNORMAL
DOP CALC AO PEAK VEL: 1.13 M/S
DOP CALC AO VTI: 26.29 CM
DOP CALC LVOT AREA: 3 CM2
DOP CALC LVOT DIAMETER: 1.96 CM
DOP CALC LVOT PEAK VEL: 0.95 M/S
DOP CALC LVOT STROKE VOLUME: 66.13 CM3
DOP CALCLVOT PEAK VEL VTI: 21.93 CM
E WAVE DECELERATION TIME: 220.54 MSEC
E/A RATIO: 0.47
E/E' RATIO: 9.45 M/S
ECHO LV POSTERIOR WALL: 1.34 CM (ref 0.6–1.1)
EOSINOPHIL # BLD AUTO: 0.1 K/UL (ref 0–0.5)
EOSINOPHIL NFR BLD: 0.7 % (ref 0–8)
ERYTHROCYTE [DISTWIDTH] IN BLOOD BY AUTOMATED COUNT: 16.9 % (ref 11.5–14.5)
EST. GFR  (AFRICAN AMERICAN): >60 ML/MIN/1.73 M^2
EST. GFR  (NON AFRICAN AMERICAN): >60 ML/MIN/1.73 M^2
FRACTIONAL SHORTENING: 26 % (ref 28–44)
GLUCOSE SERPL-MCNC: 82 MG/DL (ref 70–110)
HCT VFR BLD AUTO: 33.4 % (ref 37–48.5)
HGB BLD-MCNC: 9.8 G/DL (ref 12–16)
IMM GRANULOCYTES # BLD AUTO: 0.15 K/UL (ref 0–0.04)
IMM GRANULOCYTES NFR BLD AUTO: 1 % (ref 0–0.5)
INTERVENTRICULAR SEPTUM: 1.26 CM (ref 0.6–1.1)
IVRT: 121.11 MSEC
LA MAJOR: 4.77 CM
LA MINOR: 5.07 CM
LA WIDTH: 3.29 CM
LEFT ATRIUM SIZE: 2.7 CM
LEFT ATRIUM VOLUME INDEX: 23.4 ML/M2
LEFT ATRIUM VOLUME: 37.11 CM3
LEFT INTERNAL DIMENSION IN SYSTOLE: 2.67 CM (ref 2.1–4)
LEFT VENTRICLE DIASTOLIC VOLUME INDEX: 34.63 ML/M2
LEFT VENTRICLE DIASTOLIC VOLUME: 54.94 ML
LEFT VENTRICLE MASS INDEX: 101 G/M2
LEFT VENTRICLE SYSTOLIC VOLUME INDEX: 16.6 ML/M2
LEFT VENTRICLE SYSTOLIC VOLUME: 26.29 ML
LEFT VENTRICULAR INTERNAL DIMENSION IN DIASTOLE: 3.61 CM (ref 3.5–6)
LEFT VENTRICULAR MASS: 160.71 G
LV LATERAL E/E' RATIO: 8.67 M/S
LV SEPTAL E/E' RATIO: 10.4 M/S
LYMPHOCYTES # BLD AUTO: 1.7 K/UL (ref 1–4.8)
LYMPHOCYTES NFR BLD: 10.7 % (ref 18–48)
MCH RBC QN AUTO: 27.8 PG (ref 27–31)
MCHC RBC AUTO-ENTMCNC: 29.3 G/DL (ref 32–36)
MCV RBC AUTO: 95 FL (ref 82–98)
MONOCYTES # BLD AUTO: 1.2 K/UL (ref 0.3–1)
MONOCYTES NFR BLD: 7.5 % (ref 4–15)
MV PEAK A VEL: 1.11 M/S
MV PEAK E VEL: 0.52 M/S
NEUTROPHILS # BLD AUTO: 12.5 K/UL (ref 1.8–7.7)
NEUTROPHILS NFR BLD: 80 % (ref 38–73)
NRBC BLD-RTO: 0 /100 WBC
PISA TR MAX VEL: 3.8 M/S
PLATELET # BLD AUTO: 257 K/UL (ref 150–350)
PMV BLD AUTO: 12 FL (ref 9.2–12.9)
POTASSIUM SERPL-SCNC: 3.8 MMOL/L (ref 3.5–5.1)
PULM VEIN S/D RATIO: 1.8
PV PEAK D VEL: 0.25 M/S
PV PEAK S VEL: 0.45 M/S
PV PEAK VELOCITY: 0.8 CM/S
RA MAJOR: 5.21 CM
RA WIDTH: 2.91 CM
RBC # BLD AUTO: 3.52 M/UL (ref 4–5.4)
RIGHT VENTRICULAR END-DIASTOLIC DIMENSION: 3.22 CM
RV TISSUE DOPPLER FREE WALL SYSTOLIC VELOCITY 1 (APICAL 4 CHAMBER VIEW): 15.76 CM/S
SINUS: 3.93 CM
SODIUM SERPL-SCNC: 147 MMOL/L (ref 136–145)
STJ: 3.51 CM
TDI LATERAL: 0.06 M/S
TDI SEPTAL: 0.05 M/S
TDI: 0.06 M/S
TR MAX PG: 58 MMHG
TRICUSPID ANNULAR PLANE SYSTOLIC EXCURSION: 1.39 CM
WBC # BLD AUTO: 15.56 K/UL (ref 3.9–12.7)

## 2020-05-18 PROCEDURE — S0179 MEGESTROL 20 MG: HCPCS | Performed by: HOSPITALIST

## 2020-05-18 PROCEDURE — 80048 BASIC METABOLIC PNL TOTAL CA: CPT

## 2020-05-18 PROCEDURE — 63600175 PHARM REV CODE 636 W HCPCS: Performed by: HOSPITALIST

## 2020-05-18 PROCEDURE — 11000001 HC ACUTE MED/SURG PRIVATE ROOM

## 2020-05-18 PROCEDURE — 25000003 PHARM REV CODE 250: Performed by: PHYSICIAN ASSISTANT

## 2020-05-18 PROCEDURE — 85025 COMPLETE CBC W/AUTO DIFF WBC: CPT

## 2020-05-18 PROCEDURE — 25000003 PHARM REV CODE 250: Performed by: HOSPITALIST

## 2020-05-18 RX ORDER — MEGESTROL ACETATE 40 MG/ML
200 SUSPENSION ORAL DAILY
Status: DISCONTINUED | OUTPATIENT
Start: 2020-05-18 | End: 2020-05-22 | Stop reason: HOSPADM

## 2020-05-18 RX ADMIN — ENOXAPARIN SODIUM 60 MG: 60 INJECTION SUBCUTANEOUS at 10:05

## 2020-05-18 RX ADMIN — DEXTROSE, SODIUM CHLORIDE, AND POTASSIUM CHLORIDE: 5; .45; .15 INJECTION INTRAVENOUS at 12:05

## 2020-05-18 RX ADMIN — DEXTROSE, SODIUM CHLORIDE, AND POTASSIUM CHLORIDE: 5; .45; .15 INJECTION INTRAVENOUS at 01:05

## 2020-05-18 RX ADMIN — METOPROLOL TARTRATE 12.5 MG: 25 TABLET, FILM COATED ORAL at 09:05

## 2020-05-18 RX ADMIN — MEGESTROL ACETATE 200 MG: 40 SUSPENSION ORAL at 10:05

## 2020-05-18 RX ADMIN — METOPROLOL TARTRATE 12.5 MG: 25 TABLET, FILM COATED ORAL at 08:05

## 2020-05-18 RX ADMIN — DEXTROSE, SODIUM CHLORIDE, AND POTASSIUM CHLORIDE: 5; .45; .15 INJECTION INTRAVENOUS at 11:05

## 2020-05-18 RX ADMIN — ENOXAPARIN SODIUM 60 MG: 60 INJECTION SUBCUTANEOUS at 11:05

## 2020-05-18 NOTE — PROGRESS NOTES
[facility] Hospital Medicine  Progress Note    Patient Name: Cece Figueroa  MRN: [id number]  Patient Class: IP- Inpatient   Admission Date: 5/16/2020  Length of Stay: 2 days  Attending Physician: Otoniel Berkowitz MD  Primary Care Provider: Janel Barajas MD        Subjective:     Principal Problem:Dehydration        HPI:  Cece Figueroa 89 y.o. female with no PMHx presents to the hospital with a chief complaint of leg swelling. The daughter reports she noticed a small area of left swelling behind her left knee and was concerned it was a DVT so she brought the patient to the hospital. She reports a gradual decline in mental status for the last 6 months with a gradual refusal to eat. She has attempted to supplement her meals with Boost, but the patient sometimes refuses. She is intermittently oriented to person and place, but sometimes her mental status is lessened beyond this. She has no medical history and takes no medications at home. She is largely bed bound at home. She has had a small pressure wound on her sacrum and ankle the family has been covering with hydrocolloid. Mckayla [phone] and her Brother who currently has power of  have both agreed for the patient to be DNR/DNI. She denies any history of her mother having Afib, but reports she would be ok with a blood thinner if necessary. The daughter personally has afib and is acceptable to Eliquis as blood thinner.     In the ED, Sodium 151, Calcium 13.9, BUN 39, troponin 0.045, CT with distending rectum and signs of possible stercoral colitis.     Overview/Hospital Course:  Cece Figueroa 89 y.o. female was brought in by family secondary to leg swelling.  Patient was noted to be dehydrated on presentation and started on IVF's.  US does show acute deep venous thrombosis in the right common femoral vein and popliteal veins.  Started on therapeutic Lovenox.  Patient with decreasing oral intake and  worsening functional status over past few months and exacerbated by recent hip fracture.  Palliative Care consulted.    Interval History: Poorly responsive.    Review of Systems   Unable to perform ROS: Mental status change     Objective:     Vital Signs (Most Recent):  Temp: 98 °F (36.7 °C) (05/18/20 1203)  Pulse: 84 (05/18/20 1203)  Resp: 18 (05/18/20 1203)  BP: 115/81 (05/18/20 1203)  SpO2: 98 % (05/18/20 1203) Vital Signs (24h Range):  Temp:  [98 °F (36.7 °C)-99.2 °F (37.3 °C)] 98 °F (36.7 °C)  Pulse:  [] 84  Resp:  [16-18] 18  SpO2:  [92 %-98 %] 98 %  BP: (115-154)/(56-85) 115/81     Weight: 58.5 kg (129 lb)  Body mass index is 23.59 kg/m².    Intake/Output Summary (Last 24 hours) at 5/18/2020 1427  Last data filed at 5/18/2020 0822  Gross per 24 hour   Intake 1433.33 ml   Output --   Net 1433.33 ml      Physical Exam   Constitutional: She appears well-developed. No distress.   thin   HENT:   Head: Normocephalic and atraumatic.   Right Ear: External ear normal.   Left Ear: External ear normal.   Eyes: Conjunctivae and EOM are normal. Right eye exhibits no discharge. Left eye exhibits no discharge.   Neck: Normal range of motion. No thyromegaly present.   Cardiovascular: Normal rate and regular rhythm.   No murmur heard.  Pulmonary/Chest: Effort normal and breath sounds normal. No respiratory distress.   Abdominal: Soft. Bowel sounds are normal. She exhibits no distension and no mass. There is no tenderness.   Musculoskeletal: She exhibits no edema or deformity.   Small amount of erythema to left fibular head non-tender   Neurological: No cranial nerve deficit.   Awakes to verbal stimuli.  Answer simple yes or no questions.   Skin: Skin is warm and dry.   Psychiatric: She has a normal mood and affect. Her behavior is normal.   Nursing note and vitals reviewed.      Significant Labs:   BMP:   Recent Labs   Lab 05/18/20  0429   GLU 82   *   K 3.8   *   CO2 25   BUN 27   CREATININE 0.7   CALCIUM  11.9*     CBC:   Recent Labs   Lab 05/17/20  0527 05/18/20  0429   WBC 18.54* 15.56*   HGB 11.0* 9.8*   HCT 37.2 33.4*    257       Significant Imaging: I have reviewed all pertinent imaging results/findings within the past 24 hours.      Assessment/Plan:      * Dehydration  Daughter reported poor PO intake for extensive period of time.  Hypernatremia and Hypercalcemia on presentation.  -Started on 1/2 NS with 20 of KCl  Poor oral intake.  Changed fluids to D5 1/2 NS  Monitor BMP  -neuro checks/seizure precautions  Severe malnutrition POA--start Megace.  Worsening functional status with failure to thrive.  Palliative Care consulted.    Acute deep vein thrombosis (DVT) of femoral vein of right lower extremity  Continue full dose Lovenox.    Advanced care planning/counseling discussion  Discussed code status with Daughter Mckayla. She reports she and her brother discussed this and have decided for DNR/DNI. Her brother has power of  but is attempting to transfer this to her. DNR order placed in chart. Will consult palliative care as patient has had a gradual decline for the last 6 months and has begun to refuse to eat.   Severe Malnutrition POA  Failure to thrive POA    Elevated troponin  Troponin 0.045. No complaints of chest pain, and EKG of sinus.   Probable demand ischemia.  Would not pursue further work up.    Sinus arrhythmia  Her EKG today appears to be sinus with PAC. Her rate has maintained over 100. Will start low dose beta blocker. Place on DVT prophylaxis lovenox. Monitor on telemetry. Echo. Troponin trend    Hypercalcemia  Likely related to dehydration see above    Hypernatremia  Likely related to dehydration see above      VTE Risk Mitigation (From admission, onward)         Ordered     enoxaparin injection 60 mg  Every 12 hours (non-standard times)      05/17/20 0924     IP VTE HIGH RISK PATIENT  Once      05/16/20 4372     Place sequential compression device  Until discontinued      05/16/20  1759                      Otoniel Berkowitz MD  Department of Hospital Medicine   [facility]

## 2020-05-18 NOTE — PLAN OF CARE
05/18/20 1349   Discharge Assessment   Assessment Type Discharge Planning Assessment   Confirmed/corrected address and phone number on facesheet? Yes   Assessment information obtained from? Caregiver  (Corby Baxterll [phone])   Prior to hospitilization cognitive status: Not Oriented to Person;Not Oriented to Place;Not Oriented to Time   Prior to hospitalization functional status: Wheelchair Bound   Current cognitive status: Not Oriented to Person;Not Oriented to Place;Not Oriented to Time   Current Functional Status: Wheelchair Bound   Facility Arrived From: Home    Lives With grandchild(aristides);child(aristides), adult   Able to Return to Prior Arrangements yes   Is patient able to care for self after discharge? Unable to determine at this time (comments)   Who are your caregiver(s) and their phone number(s)? Pt's Mckayla baxter [phone]   Patient's perception of discharge disposition home or selfcare   Readmission Within the Last 30 Days no previous admission in last 30 days   Patient currently being followed by outpatient case management? No   Patient currently receives any other outside agency services? Yes   How many hours a day does the patient receive services?   (45 minutes )   Name and contact number of agency or person providing outside services [facility]    Is it the patient/care giver preference to resume care with the current outside agency? Yes   Equipment Currently Used at Home wheelchair;walker, standard;bedside commode   Do you have any problems affording any of your prescribed medications? No   Is the patient taking medications as prescribed? yes   Does the patient have transportation home? No   Does the patient receive services at the [facility]? No   Discharge Plan A Home Health   DME Needed Upon Discharge  other (see comments)  (TBD)   Patient/Family in Agreement with Plan yes       [id number] - [place] - [address] AT [facility] [address]  [address] [address]  Phone: [phone] Fax: [phone]    SW contacted pt's daughter, Mckayla [phone], to complete Discharge Planning Assessment. Prior to completing assessment, Mckayla verified pt's name, address, PCP and pharmacy. Mckayla stated that pt will be living with her ([address] [place]. [address]) once pt is discharged. Mckayla will act as pt's caretaker. Mckayla stated that pt receives HH services from [facility] twice a week for 45 minutes. Mckayla stated that she would like to resume HH with [facility] at discharge. Mckayla stated that pt is completely bed bound and requires the use of a wheelchair. Mckayla also stated that pt had significant AMS at the time of admission. Mckayla request transportation home for pt.

## 2020-05-18 NOTE — ASSESSMENT & PLAN NOTE
Discussed code status with Daughter Mckayla. She reports she and her brother discussed this and have decided for DNR/DNI. Her brother has power of  but is attempting to transfer this to her. DNR order placed in chart. Will consult palliative care as patient has had a gradual decline for the last 6 months and has begun to refuse to eat.   Severe Malnutrition POA  Failure to thrive POA

## 2020-05-18 NOTE — ASSESSMENT & PLAN NOTE
Daughter reported poor PO intake for extensive period of time.  Hypernatremia and Hypercalcemia on presentation.  -Started on 1/2 NS with 20 of KCl  Poor oral intake.  Changed fluids to D5 1/2 NS  Monitor BMP  -neuro checks/seizure precautions  Severe malnutrition POA--start Megace.  Worsening functional status with failure to thrive.  Palliative Care consulted.

## 2020-05-18 NOTE — PLAN OF CARE
Problem: Fall Injury Risk  Goal: Absence of Fall and Fall-Related Injury  Outcome: Ongoing, Progressing     Problem: Skin Injury Risk Increased  Goal: Skin Health and Integrity  Outcome: Ongoing, Progressing     Problem: Adult Inpatient Plan of Care  Goal: Plan of Care Review  Outcome: Ongoing, Progressing     Problem: Wound  Goal: Optimal Wound Healing  Outcome: Ongoing, Progressing     Problem: Coping Ineffective  Goal: Effective Coping  Outcome: Ongoing, Progressing     Pt slept most of day, took only a few bites of her meals and sips of boost/juice. C/o pain med moved. Hydrocolloid dressing to sacrum c/d/I. Incontinence care provided. Pt had one bm.

## 2020-05-18 NOTE — PLAN OF CARE
Problem: Fall Injury Risk  Goal: Absence of Fall and Fall-Related Injury  Outcome: Ongoing, Progressing     Problem: Skin Injury Risk Increased  Goal: Skin Health and Integrity  Outcome: Ongoing, Progressing     Problem: Adult Inpatient Plan of Care  Goal: Plan of Care Review  Outcome: Ongoing, Progressing

## 2020-05-18 NOTE — SUBJECTIVE & OBJECTIVE
Interval History: Poorly responsive.    Review of Systems   Unable to perform ROS: Mental status change     Objective:     Vital Signs (Most Recent):  Temp: 98 °F (36.7 °C) (05/18/20 1203)  Pulse: 84 (05/18/20 1203)  Resp: 18 (05/18/20 1203)  BP: 115/81 (05/18/20 1203)  SpO2: 98 % (05/18/20 1203) Vital Signs (24h Range):  Temp:  [98 °F (36.7 °C)-99.2 °F (37.3 °C)] 98 °F (36.7 °C)  Pulse:  [] 84  Resp:  [16-18] 18  SpO2:  [92 %-98 %] 98 %  BP: (115-154)/(56-85) 115/81     Weight: 58.5 kg (129 lb)  Body mass index is 23.59 kg/m².    Intake/Output Summary (Last 24 hours) at 5/18/2020 1427  Last data filed at 5/18/2020 0822  Gross per 24 hour   Intake 1433.33 ml   Output --   Net 1433.33 ml      Physical Exam   Constitutional: She appears well-developed. No distress.   thin   HENT:   Head: Normocephalic and atraumatic.   Right Ear: External ear normal.   Left Ear: External ear normal.   Eyes: Conjunctivae and EOM are normal. Right eye exhibits no discharge. Left eye exhibits no discharge.   Neck: Normal range of motion. No thyromegaly present.   Cardiovascular: Normal rate and regular rhythm.   No murmur heard.  Pulmonary/Chest: Effort normal and breath sounds normal. No respiratory distress.   Abdominal: Soft. Bowel sounds are normal. She exhibits no distension and no mass. There is no tenderness.   Musculoskeletal: She exhibits no edema or deformity.   Small amount of erythema to left fibular head non-tender   Neurological: No cranial nerve deficit.   Awakes to verbal stimuli.  Answer simple yes or no questions.   Skin: Skin is warm and dry.   Psychiatric: She has a normal mood and affect. Her behavior is normal.   Nursing note and vitals reviewed.      Significant Labs:   BMP:   Recent Labs   Lab 05/18/20  0429   GLU 82   *   K 3.8   *   CO2 25   BUN 27   CREATININE 0.7   CALCIUM 11.9*     CBC:   Recent Labs   Lab 05/17/20  0527 05/18/20  0429   WBC 18.54* 15.56*   HGB 11.0* 9.8*   HCT 37.2 33.4*     453       Significant Imaging: I have reviewed all pertinent imaging results/findings within the past 24 hours.

## 2020-05-19 ENCOUNTER — DOCUMENT SCAN (OUTPATIENT)
Dept: HOME HEALTH SERVICES | Facility: HOSPITAL | Age: 85
End: 2020-05-19
Payer: MEDICARE

## 2020-05-19 PROBLEM — Z51.5 PALLIATIVE CARE ENCOUNTER: Status: ACTIVE | Noted: 2020-05-16

## 2020-05-19 LAB
BASOPHILS # BLD AUTO: 0.02 K/UL (ref 0–0.2)
BASOPHILS NFR BLD: 0.2 % (ref 0–1.9)
DIFFERENTIAL METHOD: ABNORMAL
EOSINOPHIL # BLD AUTO: 0.2 K/UL (ref 0–0.5)
EOSINOPHIL NFR BLD: 1.7 % (ref 0–8)
ERYTHROCYTE [DISTWIDTH] IN BLOOD BY AUTOMATED COUNT: 16.8 % (ref 11.5–14.5)
HCT VFR BLD AUTO: 31.4 % (ref 37–48.5)
HGB BLD-MCNC: 9.4 G/DL (ref 12–16)
IMM GRANULOCYTES # BLD AUTO: 0.11 K/UL (ref 0–0.04)
IMM GRANULOCYTES NFR BLD AUTO: 1 % (ref 0–0.5)
LYMPHOCYTES # BLD AUTO: 2 K/UL (ref 1–4.8)
LYMPHOCYTES NFR BLD: 17.3 % (ref 18–48)
MCH RBC QN AUTO: 27.8 PG (ref 27–31)
MCHC RBC AUTO-ENTMCNC: 29.9 G/DL (ref 32–36)
MCV RBC AUTO: 93 FL (ref 82–98)
MONOCYTES # BLD AUTO: 0.8 K/UL (ref 0.3–1)
MONOCYTES NFR BLD: 6.7 % (ref 4–15)
NEUTROPHILS # BLD AUTO: 8.3 K/UL (ref 1.8–7.7)
NEUTROPHILS NFR BLD: 73.1 % (ref 38–73)
NRBC BLD-RTO: 0 /100 WBC
PLATELET # BLD AUTO: 254 K/UL (ref 150–350)
PMV BLD AUTO: 11.4 FL (ref 9.2–12.9)
PREALB SERPL-MCNC: 6 MG/DL (ref 20–43)
PREALB SERPL-MCNC: 6 MG/DL (ref 20–43)
RBC # BLD AUTO: 3.38 M/UL (ref 4–5.4)
WBC # BLD AUTO: 11.3 K/UL (ref 3.9–12.7)

## 2020-05-19 PROCEDURE — 27000221 HC OXYGEN, UP TO 24 HOURS

## 2020-05-19 PROCEDURE — 25000003 PHARM REV CODE 250: Performed by: PHYSICIAN ASSISTANT

## 2020-05-19 PROCEDURE — 84134 ASSAY OF PREALBUMIN: CPT

## 2020-05-19 PROCEDURE — 11000001 HC ACUTE MED/SURG PRIVATE ROOM

## 2020-05-19 PROCEDURE — 63600175 PHARM REV CODE 636 W HCPCS: Performed by: HOSPITALIST

## 2020-05-19 PROCEDURE — 36415 COLL VENOUS BLD VENIPUNCTURE: CPT

## 2020-05-19 PROCEDURE — 85025 COMPLETE CBC W/AUTO DIFF WBC: CPT

## 2020-05-19 PROCEDURE — 99223 1ST HOSP IP/OBS HIGH 75: CPT | Mod: ,,, | Performed by: NURSE PRACTITIONER

## 2020-05-19 PROCEDURE — S0179 MEGESTROL 20 MG: HCPCS | Performed by: HOSPITALIST

## 2020-05-19 PROCEDURE — 94761 N-INVAS EAR/PLS OXIMETRY MLT: CPT

## 2020-05-19 PROCEDURE — 99223 PR INITIAL HOSPITAL CARE,LEVL III: ICD-10-PCS | Mod: ,,, | Performed by: NURSE PRACTITIONER

## 2020-05-19 PROCEDURE — 25000003 PHARM REV CODE 250: Performed by: HOSPITALIST

## 2020-05-19 RX ADMIN — DEXTROSE, SODIUM CHLORIDE, AND POTASSIUM CHLORIDE: 5; .45; .15 INJECTION INTRAVENOUS at 06:05

## 2020-05-19 RX ADMIN — ENOXAPARIN SODIUM 60 MG: 60 INJECTION SUBCUTANEOUS at 10:05

## 2020-05-19 RX ADMIN — MEGESTROL ACETATE 200 MG: 40 SUSPENSION ORAL at 09:05

## 2020-05-19 RX ADMIN — METOPROLOL TARTRATE 12.5 MG: 25 TABLET, FILM COATED ORAL at 09:05

## 2020-05-19 RX ADMIN — DEXTROSE, SODIUM CHLORIDE, AND POTASSIUM CHLORIDE: 5; .45; .15 INJECTION INTRAVENOUS at 09:05

## 2020-05-19 RX ADMIN — METOPROLOL TARTRATE 12.5 MG: 25 TABLET, FILM COATED ORAL at 08:05

## 2020-05-19 NOTE — CONSULTS
[facility]  [place]all[place] Medicine  Consult Note    Patient Name: Cece Figueroa  MRN: [id number]  Admission Date: 5/16/2020  Hospital Length of Stay: 3 days  Code Status: DNR   Attending Provider: Gian Butler MD  Consulting Provider: Tiffanie Cohen NP  Primary Care Physician: Janel Barajas MD  Principal Problem:Dehydration    Patient information was obtained from past medical records and ER records.      Consults  Assessment/Plan:     See assessment note below    Plan:    -continue current treatment  -ST eval ordered  -Prealbumin ordered  -Patient is a DNR  -Will need LaPOST once GOC determined   -Palliative will continue to follow for GOC     Thank you for your consult. I will follow-up with patient. Please contact us if you have any additional questions.    Subjective:     HPI:   Cece Figueroa 89 y.o. female with no PMHx presents to the hospital with a chief complaint of leg swelling. The daughter reports she noticed a small area of left swelling behind her left knee and was concerned it was a DVT so she brought the patient to the hospital. She reports a gradual decline in mental status for the last 6 months with a gradual refusal to eat. She has attempted to supplement her meals with Boost, but the patient sometimes refuses. She is intermittently oriented to person and place, but sometimes her mental status is lessened beyond this. She has no medical history and takes no medications at home. She is largely bed bound at home. She has had a small pressure wound on her sacrum and ankle the family has been covering with hydrocolloid. Mckayla [phone] and her Brother who currently has power of  have both agreed for the patient to be DNR/DNI. She denies any history of her mother having Afib, but reports she would be ok with a blood thinner if necessary. The daughter personally has afib and is acceptable to Eliquis as blood thinner.      In the ED, Sodium 151, Calcium  13.9, BUN 39, troponin 0.045, CT with distending rectum and signs of possible stercoral colitis.      Overview/Hospital Course:  Cece Figueroa 89 y.o. female was brought in by family secondary to leg swelling.  Patient was noted to be dehydrated on presentation and started on IVF's.  US does show acute deep venous thrombosis in the right common femoral vein and popliteal veins.  Started on therapeutic Lovenox.  Patient with decreasing oral intake and worsening functional status over past few months and exacerbated by recent hip fracture.  Palliative Care consulted.    Hospital Course:  No notes on file      History reviewed. No pertinent past medical history.    Past Surgical History:   Procedure Laterality Date    HYSTERECTOMY      INTRAMEDULLARY RODDING OF FEMUR Right 3/30/2020    Procedure: INSERTION, INTRAMEDULLARY ARMANDO, FEMUR-HIP;  Surgeon: Armen Norwood MD;  Location: [facility];  Service: Orthopedics;  Laterality: Right;  JALEESA       Review of patient's allergies indicates:  No Known Allergies    Medications:  Continuous Infusions:   dextrose 5 % and 0.45 % NaCl with KCl 20 mEq 100 mL/hr at 05/19/20 0918     Scheduled Meds:   enoxaparin  1 mg/kg Subcutaneous Q12H    megestroL  200 mg Oral Daily    metoprolol tartrate  12.5 mg Oral BID     PRN Meds:acetaminophen, ondansetron, polyethylene glycol, sodium chloride 0.9%    Family History     None        Tobacco Use    Smoking status: Never Smoker    Smokeless tobacco: Never Used   Substance and Sexual Activity    Alcohol use: Never     Frequency: Never     Drinks per session: Patient refused     Binge frequency: Patient refused    Drug use: Never    Sexual activity: Not on file       Review of Systems   HENT: Positive for trouble swallowing.      Objective:     Vital Signs (Most Recent):  Temp: 97.6 °F (36.4 °C) (05/19/20 1109)  Pulse: 93 (05/19/20 1318)  Resp: 16 (05/19/20 1109)  BP: 126/85 (05/19/20 1109)  SpO2: 98 % (05/19/20 1318)  Vital Signs (24h Range):  Temp:  [97.6 °F (36.4 °C)-99.1 °F (37.3 °C)] 97.6 °F (36.4 °C)  Pulse:  [] 93  Resp:  [16] 16  SpO2:  [93 %-99 %] 98 %  BP: (124-151)/(57-85) 126/85     Weight: 58.5 kg (129 lb)  Body mass index is 23.59 kg/m².    Unable to complete ESAS due to patient's mental status and lack of understanding of scale but she is able to tell me she has pain     Review of Symptoms  Symptom Assessment (ESAS 0-10 scale)   ESAS 0 1 2 3 4 5 6 7 8 9 10   Pain              Dyspnea              Anxiety              Nausea              Depression               Anorexia              Fatigue              Insomnia              Restlessness               Agitation              Physical Exam   Constitutional:   frail   Cardiovascular: Normal rate and regular rhythm.   Pulmonary/Chest: Effort normal and breath sounds normal.   Oxygen on   Abdominal: Soft. Bowel sounds are normal.   Musculoskeletal: She exhibits no edema.   Neurological: She is alert.   x1   Skin: Skin is warm and dry.   Multiple pressure areas   Nursing note and vitals reviewed.      Significant Labs: All pertinent labs within the past 24 hours have been reviewed.  CBC:   Recent Labs   Lab 05/19/20  0432   WBC 11.30   HGB 9.4*   HCT 31.4*   MCV 93        BMP:  No results for input(s): GLU, NA, K, CL, CO2, BUN, CREATININE, CALCIUM, MG in the last 24 hours.  LFT:  Lab Results   Component Value Date    AST 16 05/16/2020    ALKPHOS 108 05/16/2020    BILITOT 1.0 05/16/2020     Albumin:   Albumin   Date Value Ref Range Status   05/16/2020 2.7 (L) 3.5 - 5.2 g/dL Final     Protein:   Total Protein   Date Value Ref Range Status   05/16/2020 6.7 6.0 - 8.4 g/dL Final     Lactic acid:   No results found for: LACTATE    Significant Imaging: I have reviewed all pertinent imaging results/findings within the past 24 hours.    Advance Care Planning   Advanced Directives::  Living Will: No  LaPOST: No  Do Not Resuscitate Status: patient is a DNR  Medical  "Power of : Nothing on chart    Decision-Making Capacity: lacks capacity to make complex decisions       Living Arrangements: Lives with family      ASSESSMENT/PLAN:  Patient is sleeping but easily aroused when I called her name. She is alert and oriented to self. She will answer questions but is not engaging. She has oxygen and according to family on admission patient wears 2L at home. No clear diagnosis as to need. Patient c/o pain with my repositioning with hx of OA and recent surgical repair of right femur/hip fx . Lunch tray at bedside. I asked patient to take a bite of applesauce for me and when she got it in her mouth she held it and could not swallow it even with encouragement. She then let it dribble out of her mouth and said " I can't swallow it."  I will discuss with Dr Butler about ST eval  Noted limited PMHx with no home meds and poor PO intake per children. She has no weights from previous PCP appts or hospitalization to compare for any significant weight loss and her albumin is 2.7 and is not a good indicator of long term poor nutrition so I will recommend a prealbumin.  Discussed with Dr Butler and will order Prealbumin and ST eval and depending on results will have a better understanding of where to go with family regarding options. As of now patient does not have a hospice diagnosis per Medicare guidelines. If you asked me do I think she will possibly die in next 6 months I would say most likely if she continues to have poor nutritional intake and decreased functional status. PPS 40% now.              .       > 50% of 70 min visit spent in chart review, face to face discussion of goals of care,  symptom assessment, coordination of care and emotional support.    Tiffanie Cohen, NP  Palliative Medicine  [facility]            "

## 2020-05-19 NOTE — SUBJECTIVE & OBJECTIVE
"Interval History: Poorly responsive. Responds"yes" "No"   Verbalized "my son in on his way to pick her up"     Review of Systems   Unable to perform ROS: Mental status change     Objective:     Vital Signs (Most Recent):  Temp: 98 °F (36.7 °C) (05/19/20 1612)  Pulse: 80 (05/19/20 1612)  Resp: 16 (05/19/20 1612)  BP: (!) 144/64 (05/19/20 1612)  SpO2: 98 % (05/19/20 1721) Vital Signs (24h Range):  Temp:  [97.6 °F (36.4 °C)-99.1 °F (37.3 °C)] 98 °F (36.7 °C)  Pulse:  [] 80  Resp:  [16] 16  SpO2:  [85 %-99 %] 98 %  BP: (124-148)/(57-85) 144/64     Weight: 58.5 kg (129 lb)  Body mass index is 23.59 kg/m².    Intake/Output Summary (Last 24 hours) at 5/19/2020 1729  Last data filed at 5/19/2020 1214  Gross per 24 hour   Intake 1335 ml   Output --   Net 1335 ml      Physical Exam   Constitutional: She appears well-developed. No distress.   thin   HENT:   Head: Normocephalic and atraumatic.   Right Ear: External ear normal.   Left Ear: External ear normal.   Eyes: Conjunctivae and EOM are normal. Right eye exhibits no discharge. Left eye exhibits no discharge.   Neck: Normal range of motion. No thyromegaly present.   Cardiovascular: Normal rate and regular rhythm.   No murmur heard.  Pulmonary/Chest: Effort normal and breath sounds normal. No respiratory distress.   Abdominal: Soft. Bowel sounds are normal. She exhibits no distension and no mass. There is no tenderness.   Musculoskeletal: She exhibits no edema or deformity.   Small amount of erythema to left fibular head non-tender   Neurological: No cranial nerve deficit.   Awakes to verbal stimuli.  Answer simple yes or no questions.   Skin: Skin is warm and dry.   Psychiatric: She has a normal mood and affect. Her behavior is normal.   Nursing note and vitals reviewed.      Significant Labs:   BMP:   Recent Labs   Lab 05/18/20  0429   GLU 82   *   K 3.8   *   CO2 25   BUN 27   CREATININE 0.7   CALCIUM 11.9*     CBC:   Recent Labs   Lab 05/18/20  0429 " 05/19/20  0432   WBC 15.56* 11.30   HGB 9.8* 9.4*   HCT 33.4* 31.4*    254       Significant Imaging: I have reviewed all pertinent imaging results/findings within the past 24 hours.

## 2020-05-19 NOTE — ASSESSMENT & PLAN NOTE
Daughter reported poor PO intake for extensive period of time.  Hypernatremia and Hypercalcemia on presentation.  -Started on 1/2 NS with 20 of KCl  Poor oral intake.  Changed fluids to D5 1/2 NS  Monitor BMP  -neuro checks/seizure precautions  Severe malnutrition POA--start Megace.  Worsening functional status with failure to thrive.  Palliative Care consulted.    5/19/2020  Profound malnutrition with Pre-Albumin of 6 only.  Palliative care consult   Will need a family discussion for goals of care.

## 2020-05-19 NOTE — PHYSICIAN QUERY
PT Name: Cece Figueroa  MR #: [id number]    CAUSE AND EFFECT RELATIONSHIP CLARIFICATION: device and DVT     CDS/: James Gaines RN              Contact information:   [email or web address]    This form is a permanent document in the medical record.     Query Date: May 19, 2020    By submitting this query, we are merely seeking further clarification of documentation. Please utilize your independent clinical judgment when addressing the question(s) below.    Supporting Clinical Findings   Location in Medical Record     INTRAMEDULLARY RODDING OF FEMUR Right 3/30/2020    Procedure: INSERTION, INTRAMEDULLARY ARMANDO, FEMUR-HIP;  Surgeon: Armen Norwood MD;  Location: [facility] OR;  Service: Orthopedics;  Laterality: Right;  JALEESA     chief Complaint:       Leg bruising:   EMS reports pt family concerned she has a blood clot in her leg due to bruising in the right inner leg. hip surgery about 8 weeks ago.....The daughter personally has afib and is acceptable to Eliquis as blood thinner. .....      Impression:  Acute deep venous thrombosis in the right common femoral vein, femoral vein, and popliteal veins.  No DVT in the left lower extremity.     Acute deep vein thrombosis (DVT) of femoral vein of right lower extremity  Continue full dose Lovenox.                                                                                                                                                                                5/16 HP- Past surgical History          5/16  HP          5/16  Lower extremity Veins Bilateral     5/17 PN, Hospital medicine                                                                                                                                                                                                    Provider, please clarify if there is any clinical correlation between:      Intramedullary Rodding of Right femur (device)    and     DVT    .           Are the  conditions:      [  ] Due to or associated with each other   [  ] Unrelated to each other   [  ] Other explanation (Please Specify): ______________   [  ] Clinically Undetermined                                                                               Please document in your progress notes daily for the duration of treatment until resolved and include in your discharge summary.

## 2020-05-19 NOTE — PLAN OF CARE
05/19/20 1420   Medicare Message   Important Message from Medicare regarding Discharge Appeal Rights Explained to patient/caregiver   Date IMM was signed 05/19/20   Time IMM was signed 1418     KENTON contacted pt's daughter, Mckayla [phone], to complete IMM. Mckayla verbalized understanding of IMM.

## 2020-05-19 NOTE — PROGRESS NOTES
[facility] Hospital Medicine  Progress Note    Patient Name: Cece Figueroa  MRN: [id number]  Patient Class: IP- Inpatient   Admission Date: 5/16/2020  Length of Stay: 3 days  Attending Physician: Gian Butler MD  Primary Care Provider: Janel Barajas MD        Subjective:     Principal Problem:Dehydration        HPI:  Cece Figueroa 89 y.o. female with no PMHx presents to the hospital with a chief complaint of leg swelling. The daughter reports she noticed a small area of left swelling behind her left knee and was concerned it was a DVT so she brought the patient to the hospital. She reports a gradual decline in mental status for the last 6 months with a gradual refusal to eat. She has attempted to supplement her meals with Boost, but the patient sometimes refuses. She is intermittently oriented to person and place, but sometimes her mental status is lessened beyond this. She has no medical history and takes no medications at home. She is largely bed bound at home. She has had a small pressure wound on her sacrum and ankle the family has been covering with hydrocolloid. Mckayla [phone] and her Brother who currently has power of  have both agreed for the patient to be DNR/DNI. She denies any history of her mother having Afib, but reports she would be ok with a blood thinner if necessary. The daughter personally has afib and is acceptable to Eliquis as blood thinner.     In the ED, Sodium 151, Calcium 13.9, BUN 39, troponin 0.045, CT with distending rectum and signs of possible stercoral colitis.     Overview/Hospital Course:  Cece Figueroa 89 y.o. female was brought in by family secondary to leg swelling.  Patient was noted to be dehydrated on presentation and started on IVF's.  US does show acute deep venous thrombosis in the right common femoral vein and popliteal veins.  Started on therapeutic Lovenox.  Patient with decreasing oral intake and worsening  "functional status over past few months and exacerbated by recent hip fracture.  Palliative Care consulted.    05/19/2020  Continue to have worsening of the dysphagia  Prealbumin at very level of 6      Interval History: Poorly responsive. Responds"yes" "No"   Verbalized "my son in on his way to pick her up"     Review of Systems   Unable to perform ROS: Mental status change     Objective:     Vital Signs (Most Recent):  Temp: 98 °F (36.7 °C) (05/19/20 1612)  Pulse: 80 (05/19/20 1612)  Resp: 16 (05/19/20 1612)  BP: (!) 144/64 (05/19/20 1612)  SpO2: 98 % (05/19/20 1721) Vital Signs (24h Range):  Temp:  [97.6 °F (36.4 °C)-99.1 °F (37.3 °C)] 98 °F (36.7 °C)  Pulse:  [] 80  Resp:  [16] 16  SpO2:  [85 %-99 %] 98 %  BP: (124-148)/(57-85) 144/64     Weight: 58.5 kg (129 lb)  Body mass index is 23.59 kg/m².    Intake/Output Summary (Last 24 hours) at 5/19/2020 1729  Last data filed at 5/19/2020 1214  Gross per 24 hour   Intake 1335 ml   Output --   Net 1335 ml      Physical Exam   Constitutional: She appears well-developed. No distress.   thin   HENT:   Head: Normocephalic and atraumatic.   Right Ear: External ear normal.   Left Ear: External ear normal.   Eyes: Conjunctivae and EOM are normal. Right eye exhibits no discharge. Left eye exhibits no discharge.   Neck: Normal range of motion. No thyromegaly present.   Cardiovascular: Normal rate and regular rhythm.   No murmur heard.  Pulmonary/Chest: Effort normal and breath sounds normal. No respiratory distress.   Abdominal: Soft. Bowel sounds are normal. She exhibits no distension and no mass. There is no tenderness.   Musculoskeletal: She exhibits no edema or deformity.   Small amount of erythema to left fibular head non-tender   Neurological: No cranial nerve deficit.   Awakes to verbal stimuli.  Answer simple yes or no questions.   Skin: Skin is warm and dry.   Psychiatric: She has a normal mood and affect. Her behavior is normal.   Nursing note and vitals " reviewed.      Significant Labs:   BMP:   Recent Labs   Lab 05/18/20  0429   GLU 82   *   K 3.8   *   CO2 25   BUN 27   CREATININE 0.7   CALCIUM 11.9*     CBC:   Recent Labs   Lab 05/18/20 0429 05/19/20  0432   WBC 15.56* 11.30   HGB 9.8* 9.4*   HCT 33.4* 31.4*    254       Significant Imaging: I have reviewed all pertinent imaging results/findings within the past 24 hours.      Assessment/Plan:      * Dehydration  Daughter reported poor PO intake for extensive period of time.  Hypernatremia and Hypercalcemia on presentation.  -Started on 1/2 NS with 20 of KCl  Poor oral intake.  Changed fluids to D5 1/2 NS  Monitor BMP  -neuro checks/seizure precautions  Severe malnutrition POA--start Megace.  Worsening functional status with failure to thrive.  Palliative Care consulted.    5/19/2020  Profound malnutrition with Pre-Albumin of 6 only.  Palliative care consult   Will need a family discussion for goals of care.       Acute deep vein thrombosis (DVT) of femoral vein of right lower extremity  Continue full dose Lovenox.    Palliative care encounter  Discussed code status with Daughter Mckayla. She reports she and her brother discussed this and have decided for DNR/DNI. Her brother has power of  but is attempting to transfer this to her. DNR order placed in chart. Will consult palliative care as patient has had a gradual decline for the last 6 months and has begun to refuse to eat.   Severe Malnutrition POA  Failure to thrive POA    Elevated troponin  Troponin 0.045. No complaints of chest pain, and EKG of sinus.   Probable demand ischemia.  Would not pursue further work up.    Sinus arrhythmia  Her EKG today appears to be sinus with PAC. Her rate has maintained over 100. Will start low dose beta blocker. Place on DVT prophylaxis lovenox. Monitor on telemetry. Echo. Troponin trend    Hypercalcemia  Poor oral intake of solids and liquids.   Continue with IV fluid administration.      Hypernatremia  In the setting of poor oral intake   Continue with IV fluid.       VTE Risk Mitigation (From admission, onward)         Ordered     enoxaparin injection 60 mg  Every 12 hours (non-standard times)      05/17/20 0924     IP VTE HIGH RISK PATIENT  Once      05/16/20 1759     Place sequential compression device  Until discontinued      05/16/20 1759                      Gian Butler MD  Department of Hospital Medicine   [facility]

## 2020-05-19 NOTE — SUBJECTIVE & OBJECTIVE
History reviewed. No pertinent past medical history.    Past Surgical History:   Procedure Laterality Date    HYSTERECTOMY      INTRAMEDULLARY RODDING OF FEMUR Right 3/30/2020    Procedure: INSERTION, INTRAMEDULLARY ARMANDO, FEMUR-HIP;  Surgeon: Armen Norwood MD;  Location: [facility];  Service: Orthopedics;  Laterality: Right;  JALEESA       Review of patient's allergies indicates:  No Known Allergies    Medications:  Continuous Infusions:   dextrose 5 % and 0.45 % NaCl with KCl 20 mEq 100 mL/hr at 05/19/20 0918     Scheduled Meds:   enoxaparin  1 mg/kg Subcutaneous Q12H    megestroL  200 mg Oral Daily    metoprolol tartrate  12.5 mg Oral BID     PRN Meds:acetaminophen, ondansetron, polyethylene glycol, sodium chloride 0.9%    Family History     None        Tobacco Use    Smoking status: Never Smoker    Smokeless tobacco: Never Used   Substance and Sexual Activity    Alcohol use: Never     Frequency: Never     Drinks per session: Patient refused     Binge frequency: Patient refused    Drug use: Never    Sexual activity: Not on file       Review of Systems   HENT: Positive for trouble swallowing.      Objective:     Vital Signs (Most Recent):  Temp: 97.6 °F (36.4 °C) (05/19/20 1109)  Pulse: 93 (05/19/20 1318)  Resp: 16 (05/19/20 1109)  BP: 126/85 (05/19/20 1109)  SpO2: 98 % (05/19/20 1318) Vital Signs (24h Range):  Temp:  [97.6 °F (36.4 °C)-99.1 °F (37.3 °C)] 97.6 °F (36.4 °C)  Pulse:  [] 93  Resp:  [16] 16  SpO2:  [93 %-99 %] 98 %  BP: (124-151)/(57-85) 126/85     Weight: 58.5 kg (129 lb)  Body mass index is 23.59 kg/m².    Unable to complete ESAS due to patient's mental status and lack of understanding of scale but she is able to tell me she has pain     Review of Symptoms  Symptom Assessment (ESAS 0-10 scale)   ESAS 0 1 2 3 4 5 6 7 8 9 10   Pain              Dyspnea              Anxiety              Nausea              Depression               Anorexia              Fatigue              Insomnia               Restlessness               Agitation              Physical Exam   Constitutional:   frail   Cardiovascular: Normal rate and regular rhythm.   Pulmonary/Chest: Effort normal and breath sounds normal.   Oxygen on   Abdominal: Soft. Bowel sounds are normal.   Musculoskeletal: She exhibits no edema.   Neurological: She is alert.   x1   Skin: Skin is warm and dry.   Multiple pressure areas   Nursing note and vitals reviewed.      Significant Labs: All pertinent labs within the past 24 hours have been reviewed.  CBC:   Recent Labs   Lab 05/19/20  0432   WBC 11.30   HGB 9.4*   HCT 31.4*   MCV 93        BMP:  No results for input(s): GLU, NA, K, CL, CO2, BUN, CREATININE, CALCIUM, MG in the last 24 hours.  LFT:  Lab Results   Component Value Date    AST 16 05/16/2020    ALKPHOS 108 05/16/2020    BILITOT 1.0 05/16/2020     Albumin:   Albumin   Date Value Ref Range Status   05/16/2020 2.7 (L) 3.5 - 5.2 g/dL Final     Protein:   Total Protein   Date Value Ref Range Status   05/16/2020 6.7 6.0 - 8.4 g/dL Final     Lactic acid:   No results found for: LACTATE    Significant Imaging: I have reviewed all pertinent imaging results/findings within the past 24 hours.    Advance Care Planning   Advanced Directives::  Living Will: No  LaPOST: No  Do Not Resuscitate Status: patient is a DNR  Medical Power of : Nothing on chart    Decision-Making Capacity: lacks capacity to make complex decisions       Living Arrangements: Lives with family      ASSESSMENT/PLAN:  Patient is sleeping but easily aroused when I called her name. She is alert and oriented to self. She will answer questions but is not engaging. She has oxygen and according to family on admission patient wears 2L at home. No clear diagnosis as to need. Patient c/o pain with my repositioning with hx of OA and recent surgical repair of right femur/hip fx . Lunch tray at bedside. I asked patient to take a bite of applesauce for me and when she got it in her  "mouth she held it and could not swallow it even with encouragement. She then let it dribble out of her mouth and said " I can't swallow it."  I will discuss with Dr Butler about ST eval  Noted limited PMHx with no home meds and poor PO intake per children. She has no weights from previous PCP appts or hospitalization to compare for any significant weight loss and her albumin is 2.7 and is not a good indicator of long term poor nutrition so I will recommend a prealbumin.  Discussed with Dr Butler and will order Prealbumin and ST eval and depending on results will have a better understanding of where to go with family regarding options. As of now patient does not have a hospice diagnosis per Medicare guidelines. If you asked me do I think she will possibly die in next 6 months I would say most likely if she continues to have poor nutritional intake and decreased functional status. PPS 40% now.              .   "

## 2020-05-19 NOTE — PLAN OF CARE
Problem: Fall Injury Risk  Goal: Absence of Fall and Fall-Related Injury  Outcome: Ongoing, Progressing     Problem: Skin Injury Risk Increased  Goal: Skin Health and Integrity  Outcome: Ongoing, Progressing     Problem: Adult Inpatient Plan of Care  Goal: Plan of Care Review  Outcome: Ongoing, Progressing     Problem: Wound  Goal: Optimal Wound Healing  Outcome: Ongoing, Progressing

## 2020-05-19 NOTE — HPI
Cece Figueroa 89 y.o. female with no PMHx presents to the hospital with a chief complaint of leg swelling. The daughter reports she noticed a small area of left swelling behind her left knee and was concerned it was a DVT so she brought the patient to the hospital. She reports a gradual decline in mental status for the last 6 months with a gradual refusal to eat. She has attempted to supplement her meals with Boost, but the patient sometimes refuses. She is intermittently oriented to person and place, but sometimes her mental status is lessened beyond this. She has no medical history and takes no medications at home. She is largely bed bound at home. She has had a small pressure wound on her sacrum and ankle the family has been covering with hydrocolloid. Mckayla [phone] and her Brother who currently has power of  have both agreed for the patient to be DNR/DNI. She denies any history of her mother having Afib, but reports she would be ok with a blood thinner if necessary. The daughter personally has afib and is acceptable to Eliquis as blood thinner.      In the ED, Sodium 151, Calcium 13.9, BUN 39, troponin 0.045, CT with distending rectum and signs of possible stercoral colitis.      Overview/Hospital Course:  Cece Figueroa 89 y.o. female was brought in by family secondary to leg swelling.  Patient was noted to be dehydrated on presentation and started on IVF's.  US does show acute deep venous thrombosis in the right common femoral vein and popliteal veins.  Started on therapeutic Lovenox.  Patient with decreasing oral intake and worsening functional status over past few months and exacerbated by recent hip fracture.  Palliative Care consulted.

## 2020-05-19 NOTE — PLAN OF CARE
Problem: Fall Injury Risk  Goal: Absence of Fall and Fall-Related Injury  Outcome: Ongoing, Progressing     Problem: Skin Injury Risk Increased  Goal: Skin Health and Integrity  Outcome: Ongoing, Not Progressing     Problem: Adult Inpatient Plan of Care  Goal: Plan of Care Review  Outcome: Ongoing, Progressing     Problem: Wound  Goal: Optimal Wound Healing  Outcome: Ongoing, Progressing     Problem: Coping Ineffective  Goal: Effective Coping  Outcome: Ongoing, Progressing   Pt awake/alert oriented to self and . She is able to tell me that her  die and her children names are Mckayla, Samaria and Gino. Dressing to sacral wound changed. Encourage to eat but pt refused. Aspiration precautions maintained. Did take a few bites of her apple sauce. Po lopressor crushed and given.

## 2020-05-20 PROBLEM — I49.8 SINUS ARRHYTHMIA: Status: RESOLVED | Noted: 2020-05-16 | Resolved: 2020-05-20

## 2020-05-20 LAB
BASOPHILS # BLD AUTO: 0.03 K/UL (ref 0–0.2)
BASOPHILS NFR BLD: 0.3 % (ref 0–1.9)
DIFFERENTIAL METHOD: ABNORMAL
EOSINOPHIL # BLD AUTO: 0.2 K/UL (ref 0–0.5)
EOSINOPHIL NFR BLD: 2.2 % (ref 0–8)
ERYTHROCYTE [DISTWIDTH] IN BLOOD BY AUTOMATED COUNT: 16.3 % (ref 11.5–14.5)
HCT VFR BLD AUTO: 30.2 % (ref 37–48.5)
HGB BLD-MCNC: 9.1 G/DL (ref 12–16)
IMM GRANULOCYTES # BLD AUTO: 0.11 K/UL (ref 0–0.04)
IMM GRANULOCYTES NFR BLD AUTO: 1 % (ref 0–0.5)
LYMPHOCYTES # BLD AUTO: 2 K/UL (ref 1–4.8)
LYMPHOCYTES NFR BLD: 19 % (ref 18–48)
MCH RBC QN AUTO: 27.7 PG (ref 27–31)
MCHC RBC AUTO-ENTMCNC: 30.1 G/DL (ref 32–36)
MCV RBC AUTO: 92 FL (ref 82–98)
MONOCYTES # BLD AUTO: 0.8 K/UL (ref 0.3–1)
MONOCYTES NFR BLD: 7.8 % (ref 4–15)
NEUTROPHILS # BLD AUTO: 7.3 K/UL (ref 1.8–7.7)
NEUTROPHILS NFR BLD: 69.7 % (ref 38–73)
NRBC BLD-RTO: 0 /100 WBC
PLATELET # BLD AUTO: 262 K/UL (ref 150–350)
PMV BLD AUTO: 11.1 FL (ref 9.2–12.9)
RBC # BLD AUTO: 3.28 M/UL (ref 4–5.4)
WBC # BLD AUTO: 10.5 K/UL (ref 3.9–12.7)

## 2020-05-20 PROCEDURE — 97535 SELF CARE MNGMENT TRAINING: CPT

## 2020-05-20 PROCEDURE — 94761 N-INVAS EAR/PLS OXIMETRY MLT: CPT

## 2020-05-20 PROCEDURE — 25000003 PHARM REV CODE 250: Performed by: PHYSICIAN ASSISTANT

## 2020-05-20 PROCEDURE — 63600175 PHARM REV CODE 636 W HCPCS: Performed by: HOSPITALIST

## 2020-05-20 PROCEDURE — 92610 EVALUATE SWALLOWING FUNCTION: CPT

## 2020-05-20 PROCEDURE — 11000001 HC ACUTE MED/SURG PRIVATE ROOM

## 2020-05-20 PROCEDURE — 25000003 PHARM REV CODE 250: Performed by: HOSPITALIST

## 2020-05-20 PROCEDURE — 36415 COLL VENOUS BLD VENIPUNCTURE: CPT

## 2020-05-20 PROCEDURE — 85025 COMPLETE CBC W/AUTO DIFF WBC: CPT

## 2020-05-20 PROCEDURE — S0179 MEGESTROL 20 MG: HCPCS | Performed by: HOSPITALIST

## 2020-05-20 RX ADMIN — DEXTROSE, SODIUM CHLORIDE, AND POTASSIUM CHLORIDE: 5; .45; .15 INJECTION INTRAVENOUS at 05:05

## 2020-05-20 RX ADMIN — DEXTROSE, SODIUM CHLORIDE, AND POTASSIUM CHLORIDE: 5; .45; .15 INJECTION INTRAVENOUS at 04:05

## 2020-05-20 RX ADMIN — ENOXAPARIN SODIUM 60 MG: 60 INJECTION SUBCUTANEOUS at 10:05

## 2020-05-20 RX ADMIN — METOPROLOL TARTRATE 12.5 MG: 25 TABLET, FILM COATED ORAL at 08:05

## 2020-05-20 RX ADMIN — MEGESTROL ACETATE 200 MG: 40 SUSPENSION ORAL at 09:05

## 2020-05-20 RX ADMIN — METOPROLOL TARTRATE 12.5 MG: 25 TABLET, FILM COATED ORAL at 09:05

## 2020-05-20 NOTE — PROGRESS NOTES
[facility] Hospital Medicine  Progress Note    Patient Name: Cece Figueroa  MRN: [id number]  Patient Class: IP- Inpatient   Admission Date: 5/16/2020  Length of Stay: 4 days  Attending Physician: Gian Butler MD  Primary Care Provider: Janel Barajas MD        Subjective:     Principal Problem:Dehydration        HPI:  Cece Figueroa 89 y.o. female with no PMHx presents to the hospital with a chief complaint of leg swelling. The daughter reports she noticed a small area of left swelling behind her left knee and was concerned it was a DVT so she brought the patient to the hospital. She reports a gradual decline in mental status for the last 6 months with a gradual refusal to eat. She has attempted to supplement her meals with Boost, but the patient sometimes refuses. She is intermittently oriented to person and place, but sometimes her mental status is lessened beyond this. She has no medical history and takes no medications at home. She is largely bed bound at home. She has had a small pressure wound on her sacrum and ankle the family has been covering with hydrocolloid. Mckayla [phone] and her Brother who currently has power of  have both agreed for the patient to be DNR/DNI. She denies any history of her mother having Afib, but reports she would be ok with a blood thinner if necessary. The daughter personally has afib and is acceptable to Eliquis as blood thinner.     In the ED, Sodium 151, Calcium 13.9, BUN 39, troponin 0.045, CT with distending rectum and signs of possible stercoral colitis.     Overview/Hospital Course:  Cece Figueroa 89 y.o. female was brought in by family secondary to leg swelling.  Patient was noted to be dehydrated on presentation and started on IVF's.  US does show acute deep venous thrombosis in the right common femoral vein and popliteal veins.  Started on therapeutic Lovenox.  Patient with decreasing oral intake and worsening  "functional status over past few months and exacerbated by recent hip fracture.  Palliative Care consulted.    05/19/2020  Continue to have worsening of the dysphagia  Prealbumin at very level of 6      5/20/2020  The patient was seen by [facility]  Recommends mechanical soft diet      Interval History: Poorly responsive. Responds"yes" "No"   Pt verablized, " I am very sick"     Review of Systems   Unable to perform ROS: Mental status change   HENT: Positive for sinus pressure.      Objective:     Vital Signs (Most Recent):  Temp: 97.6 °F (36.4 °C) (05/20/20 1107)  Pulse: 84 (05/20/20 1107)  Resp: 18 (05/20/20 1107)  BP: 135/65 (05/20/20 1107)  SpO2: 98 % (05/20/20 1107) Vital Signs (24h Range):  Temp:  [97.1 °F (36.2 °C)-99 °F (37.2 °C)] 97.6 °F (36.4 °C)  Pulse:  [] 84  Resp:  [16-18] 18  SpO2:  [85 %-100 %] 98 %  BP: (130-163)/(60-70) 135/65     Weight: 58.5 kg (129 lb)  Body mass index is 23.59 kg/m².    Intake/Output Summary (Last 24 hours) at 5/20/2020 1610  Last data filed at 5/20/2020 0534  Gross per 24 hour   Intake 60 ml   Output 900 ml   Net -840 ml      Physical Exam   Constitutional: She appears well-developed. No distress.   thin   HENT:   Head: Normocephalic and atraumatic.   Right Ear: External ear normal.   Left Ear: External ear normal.   Eyes: Conjunctivae and EOM are normal. Right eye exhibits no discharge. Left eye exhibits no discharge.   Neck: Normal range of motion. No thyromegaly present.   Cardiovascular: Normal rate and regular rhythm.   No murmur heard.  Pulmonary/Chest: Effort normal and breath sounds normal. No respiratory distress.   Abdominal: Soft. Bowel sounds are normal. She exhibits no distension and no mass. There is no tenderness.   Musculoskeletal: She exhibits no edema or deformity.   Small amount of erythema to left fibular head non-tender   Neurological: No cranial nerve deficit.   Awakes to verbal stimuli.  Answer simple yes or no questions.   Skin: Skin is warm and dry. "   Psychiatric: She has a normal mood and affect. Her behavior is normal.   Nursing note and vitals reviewed.      Significant Labs:   BMP:   No results for input(s): GLU, NA, K, CL, CO2, BUN, CREATININE, CALCIUM, MG in the last 48 hours.  CBC:   Recent Labs   Lab 05/19/20  0432 05/20/20  0348   WBC 11.30 10.50   HGB 9.4* 9.1*   HCT 31.4* 30.2*    262       Significant Imaging: I have reviewed all pertinent imaging results/findings within the past 24 hours.      Assessment/Plan:      * Dehydration  Daughter reported poor PO intake for extensive period of time.  Hypernatremia and Hypercalcemia on presentation.  -Started on 1/2 NS with 20 of KCl  Poor oral intake.  Changed fluids to D5 1/2 NS  Monitor BMP  -neuro checks/seizure precautions  Severe malnutrition POA--start Megace.  Worsening functional status with failure to thrive.  Palliative Care consulted.    5/19/2020  Profound malnutrition with Pre-Albumin of 6 only.  Palliative care consult   Will need a family discussion for goals of care.     5/20/2020  The patient is more alert and responded  Continue on IV fluid administration         Acute deep vein thrombosis (DVT) of femoral vein of right lower extremity  Continue full dose Lovenox.    Palliative care encounter  Discussed code status with Daughter Mckayla. She reports she and her brother discussed this and have decided for DNR/DNI. Her brother has power of  but is attempting to transfer this to her. DNR order placed in chart. Will consult palliative care as patient has had a gradual decline for the last 6 months and has begun to refuse to eat.   Severe Malnutrition POA  Failure to thrive POA    5/20/2020  Recommend home hospice  Case communicated with the family  The son reported he will speak with the his sis and niece who are taking care of the patient  [facility]     Elevated troponin  Troponin 0.045. No complaints of chest pain, and EKG of  sinus.   Probable demand ischemia.  Would not pursue further work up.    Hypercalcemia  Poor oral intake of solids and liquids.   Continue with IV fluid administration.     Hypernatremia  Resolved now   Suspected cause being poor oral intake  Continue with IV fluid for now.         VTE Risk Mitigation (From admission, onward)         Ordered     enoxaparin injection 60 mg  Every 12 hours (non-standard times)      05/17/20 0924     IP VTE HIGH RISK PATIENT  Once      05/16/20 1759     Place sequential compression device  Until discontinued      05/16/20 1759                      Gian Butler MD  Department of Hospital Medicine   [facility]

## 2020-05-20 NOTE — ASSESSMENT & PLAN NOTE
Discussed code status with Daughter Mckayla. She reports she and her brother discussed this and have decided for DNR/DNI. Her brother has power of  but is attempting to transfer this to her. DNR order placed in chart. Will consult palliative care as patient has had a gradual decline for the last 6 months and has begun to refuse to eat.   Severe Malnutrition POA  Failure to thrive POA    5/20/2020  Recommend home hospice  Case communicated with the family  The son reported he will speak with the his sis and niece who are taking care of the patient  The [facility].

## 2020-05-20 NOTE — ASSESSMENT & PLAN NOTE
Daughter reported poor PO intake for extensive period of time.  Hypernatremia and Hypercalcemia on presentation.  -Started on 1/2 NS with 20 of KCl  Poor oral intake.  Changed fluids to D5 1/2 NS  Monitor BMP  -neuro checks/seizure precautions  Severe malnutrition POA--start Megace.  Worsening functional status with failure to thrive.  Palliative Care consulted.    5/19/2020  Profound malnutrition with Pre-Albumin of 6 only.  Palliative care consult   Will need a family discussion for goals of care.     5/20/2020  The patient is more alert and responded  Continue on IV fluid administration

## 2020-05-20 NOTE — SUBJECTIVE & OBJECTIVE
"Interval History: Poorly responsive. Responds"yes" "No"   Pt verablized, " I am very sick"     Review of Systems   Unable to perform ROS: Mental status change   HENT: Positive for sinus pressure.      Objective:     Vital Signs (Most Recent):  Temp: 97.6 °F (36.4 °C) (05/20/20 1107)  Pulse: 84 (05/20/20 1107)  Resp: 18 (05/20/20 1107)  BP: 135/65 (05/20/20 1107)  SpO2: 98 % (05/20/20 1107) Vital Signs (24h Range):  Temp:  [97.1 °F (36.2 °C)-99 °F (37.2 °C)] 97.6 °F (36.4 °C)  Pulse:  [] 84  Resp:  [16-18] 18  SpO2:  [85 %-100 %] 98 %  BP: (130-163)/(60-70) 135/65     Weight: 58.5 kg (129 lb)  Body mass index is 23.59 kg/m².    Intake/Output Summary (Last 24 hours) at 5/20/2020 1610  Last data filed at 5/20/2020 0534  Gross per 24 hour   Intake 60 ml   Output 900 ml   Net -840 ml      Physical Exam   Constitutional: She appears well-developed. No distress.   thin   HENT:   Head: Normocephalic and atraumatic.   Right Ear: External ear normal.   Left Ear: External ear normal.   Eyes: Conjunctivae and EOM are normal. Right eye exhibits no discharge. Left eye exhibits no discharge.   Neck: Normal range of motion. No thyromegaly present.   Cardiovascular: Normal rate and regular rhythm.   No murmur heard.  Pulmonary/Chest: Effort normal and breath sounds normal. No respiratory distress.   Abdominal: Soft. Bowel sounds are normal. She exhibits no distension and no mass. There is no tenderness.   Musculoskeletal: She exhibits no edema or deformity.   Small amount of erythema to left fibular head non-tender   Neurological: No cranial nerve deficit.   Awakes to verbal stimuli.  Answer simple yes or no questions.   Skin: Skin is warm and dry.   Psychiatric: She has a normal mood and affect. Her behavior is normal.   Nursing note and vitals reviewed.      Significant Labs:   BMP:   No results for input(s): GLU, NA, K, CL, CO2, BUN, CREATININE, CALCIUM, MG in the last 48 hours.  CBC:   Recent Labs   Lab 05/19/20  0432 " 05/20/20  0348   WBC 11.30 10.50   HGB 9.4* 9.1*   HCT 31.4* 30.2*    262       Significant Imaging: I have reviewed all pertinent imaging results/findings within the past 24 hours.

## 2020-05-20 NOTE — NURSING
No distress at time of medication administration. Medication crushed and tolerated. Easy to arouse. Will continue to monitor.

## 2020-05-20 NOTE — PLAN OF CARE
Turned every 2 hours. Wedge in place.     Problem: Wound  Goal: Optimal Wound Healing  Outcome: Ongoing, Progressing  Intervention: Promote Effective Wound Healing  Flowsheets (Taken 5/20/2020 1819)  Oral Nutrition Promotion: calorie dense foods provided; rest periods promoted

## 2020-05-21 ENCOUNTER — DOCUMENT SCAN (OUTPATIENT)
Dept: HOME HEALTH SERVICES | Facility: HOSPITAL | Age: 85
End: 2020-05-21
Payer: MEDICARE

## 2020-05-21 PROBLEM — E87.0 HYPERNATREMIA: Status: RESOLVED | Noted: 2020-05-16 | Resolved: 2020-05-21

## 2020-05-21 LAB
ANION GAP SERPL CALC-SCNC: 6 MMOL/L (ref 8–16)
BASOPHILS # BLD AUTO: 0.04 K/UL (ref 0–0.2)
BASOPHILS NFR BLD: 0.4 % (ref 0–1.9)
BUN SERPL-MCNC: 11 MG/DL (ref 10–30)
CALCIUM SERPL-MCNC: 10.9 MG/DL (ref 8.7–10.5)
CHLORIDE SERPL-SCNC: 112 MMOL/L (ref 95–110)
CO2 SERPL-SCNC: 22 MMOL/L (ref 23–29)
CREAT SERPL-MCNC: 0.6 MG/DL (ref 0.5–1.4)
DIFFERENTIAL METHOD: ABNORMAL
EOSINOPHIL # BLD AUTO: 0.3 K/UL (ref 0–0.5)
EOSINOPHIL NFR BLD: 3.1 % (ref 0–8)
ERYTHROCYTE [DISTWIDTH] IN BLOOD BY AUTOMATED COUNT: 16.1 % (ref 11.5–14.5)
EST. GFR  (AFRICAN AMERICAN): >60 ML/MIN/1.73 M^2
EST. GFR  (NON AFRICAN AMERICAN): >60 ML/MIN/1.73 M^2
GLUCOSE SERPL-MCNC: 86 MG/DL (ref 70–110)
HCT VFR BLD AUTO: 32.4 % (ref 37–48.5)
HGB BLD-MCNC: 9.9 G/DL (ref 12–16)
IMM GRANULOCYTES # BLD AUTO: 0.13 K/UL (ref 0–0.04)
IMM GRANULOCYTES NFR BLD AUTO: 1.3 % (ref 0–0.5)
LYMPHOCYTES # BLD AUTO: 2.1 K/UL (ref 1–4.8)
LYMPHOCYTES NFR BLD: 20.4 % (ref 18–48)
MCH RBC QN AUTO: 27.2 PG (ref 27–31)
MCHC RBC AUTO-ENTMCNC: 30.6 G/DL (ref 32–36)
MCV RBC AUTO: 89 FL (ref 82–98)
MONOCYTES # BLD AUTO: 0.9 K/UL (ref 0.3–1)
MONOCYTES NFR BLD: 9 % (ref 4–15)
NEUTROPHILS # BLD AUTO: 6.7 K/UL (ref 1.8–7.7)
NEUTROPHILS NFR BLD: 65.8 % (ref 38–73)
NRBC BLD-RTO: 0 /100 WBC
PLATELET # BLD AUTO: 270 K/UL (ref 150–350)
PMV BLD AUTO: 11.4 FL (ref 9.2–12.9)
POTASSIUM SERPL-SCNC: 4.6 MMOL/L (ref 3.5–5.1)
RBC # BLD AUTO: 3.64 M/UL (ref 4–5.4)
SODIUM SERPL-SCNC: 140 MMOL/L (ref 136–145)
WBC # BLD AUTO: 10.21 K/UL (ref 3.9–12.7)

## 2020-05-21 PROCEDURE — 94761 N-INVAS EAR/PLS OXIMETRY MLT: CPT

## 2020-05-21 PROCEDURE — 27000221 HC OXYGEN, UP TO 24 HOURS

## 2020-05-21 PROCEDURE — 97535 SELF CARE MNGMENT TRAINING: CPT

## 2020-05-21 PROCEDURE — 94760 N-INVAS EAR/PLS OXIMETRY 1: CPT

## 2020-05-21 PROCEDURE — 25000003 PHARM REV CODE 250: Performed by: PHYSICIAN ASSISTANT

## 2020-05-21 PROCEDURE — 25000003 PHARM REV CODE 250: Performed by: HOSPITALIST

## 2020-05-21 PROCEDURE — 80048 BASIC METABOLIC PNL TOTAL CA: CPT

## 2020-05-21 PROCEDURE — 92526 ORAL FUNCTION THERAPY: CPT

## 2020-05-21 PROCEDURE — 36415 COLL VENOUS BLD VENIPUNCTURE: CPT

## 2020-05-21 PROCEDURE — S0179 MEGESTROL 20 MG: HCPCS | Performed by: HOSPITALIST

## 2020-05-21 PROCEDURE — 99231 PR SUBSEQUENT HOSPITAL CARE,LEVL I: ICD-10-PCS | Mod: ,,, | Performed by: NURSE PRACTITIONER

## 2020-05-21 PROCEDURE — 11000001 HC ACUTE MED/SURG PRIVATE ROOM

## 2020-05-21 PROCEDURE — 85025 COMPLETE CBC W/AUTO DIFF WBC: CPT

## 2020-05-21 PROCEDURE — 99231 SBSQ HOSP IP/OBS SF/LOW 25: CPT | Mod: ,,, | Performed by: NURSE PRACTITIONER

## 2020-05-21 PROCEDURE — 63600175 PHARM REV CODE 636 W HCPCS: Performed by: HOSPITALIST

## 2020-05-21 RX ORDER — HYDROCODONE BITARTRATE AND ACETAMINOPHEN 5; 325 MG/1; MG/1
1 TABLET ORAL EVERY 6 HOURS PRN
Status: DISCONTINUED | OUTPATIENT
Start: 2020-05-21 | End: 2020-05-22 | Stop reason: HOSPADM

## 2020-05-21 RX ADMIN — DEXTROSE, SODIUM CHLORIDE, AND POTASSIUM CHLORIDE: 5; .45; .15 INJECTION INTRAVENOUS at 01:05

## 2020-05-21 RX ADMIN — DEXTROSE, SODIUM CHLORIDE, AND POTASSIUM CHLORIDE: 5; .45; .15 INJECTION INTRAVENOUS at 11:05

## 2020-05-21 RX ADMIN — ACETAMINOPHEN 650 MG: 325 TABLET ORAL at 03:05

## 2020-05-21 RX ADMIN — MEGESTROL ACETATE 200 MG: 40 SUSPENSION ORAL at 08:05

## 2020-05-21 RX ADMIN — DEXTROSE, SODIUM CHLORIDE, AND POTASSIUM CHLORIDE: 5; .45; .15 INJECTION INTRAVENOUS at 03:05

## 2020-05-21 RX ADMIN — METOPROLOL TARTRATE 12.5 MG: 25 TABLET, FILM COATED ORAL at 09:05

## 2020-05-21 RX ADMIN — ENOXAPARIN SODIUM 60 MG: 60 INJECTION SUBCUTANEOUS at 11:05

## 2020-05-21 RX ADMIN — METOPROLOL TARTRATE 12.5 MG: 25 TABLET, FILM COATED ORAL at 08:05

## 2020-05-21 NOTE — ASSESSMENT & PLAN NOTE
Poor oral intake of solids and liquids.   Continue with IV fluid administration.   Improvign calcium level

## 2020-05-21 NOTE — ASSESSMENT & PLAN NOTE
Discussed code status with Daughter Mckayla. She reports she and her brother discussed this and have decided for DNR/DNI. Her brother has power of  but is attempting to transfer this to her. DNR order placed in chart. Will consult palliative care as patient has had a gradual decline for the last 6 months and has begun to refuse to eat.   Severe Malnutrition POA  Failure to thrive POA    5/20/2020  Recommend home hospice  Case communicated with the family  The son reported he will speak with the his sis and niece who are taking care of the patient  [facility]     5/21/2020  To be discharged with home hospice.

## 2020-05-21 NOTE — PROGRESS NOTES
Patient is sleeping. She only awakens with stimulation and doesn't want to be bothered. Breathing effort normal with continuous oxygen.   Patient's prealbumin 6 and this along with poor intake and dysphagia will qualify her for Medicare hospice benefit under protein calorie malnutrition. She is bedbound with recent surgical intervention for right hip and femur fx and has pain with repositiotioning, requires total assistance with ADLs, only awakens when stimulated.She will have to be fed and encouraged to eat. I anticipate she will continue to have poor intake leading to significant weight loss. She now has PPS 30% and family has chosen no aggressive measures going forward and to focus on comfort per discussion with Dr Butler. Patient's son and daughter will visit today. Plan is home with hospice per Dr Butler.       > 50% of 15 min visit spent in chart review, face to face discussion of goals of care,  symptom assessment, coordination of care and emotional support.

## 2020-05-21 NOTE — PLAN OF CARE
Wound care preformed. Foam dressing in place due to loose bowels. Patient up and oriented this afternoon, ate some cake.     Problem: Skin Injury Risk Increased  Goal: Skin Health and Integrity  Outcome: Ongoing, Progressing  Intervention: Optimize Skin Protection  Flowsheets (Taken 5/21/2020 1807)  Pressure Reduction Techniques: positioned off wounds  Pressure Reduction Devices: foam padding utilized

## 2020-05-21 NOTE — PHYSICIAN QUERY
PT Name: Cece Figueroa  MR #: [id number]     Physician Query Form - Documentation Clarification:  home O2      CDS/: James Gaines RN              Contact information:   [email or web address]    This form is a permanent document in the medical record.     Query Date: May 21, 2020    By submitting this query, we are merely seeking further clarification of documentation. Please utilize your independent clinical judgment when addressing the question(s) below.    The Medical record reflects the following:    Supporting Clinical Findings Location in Medical Record     ....Patient normally wears 2 L of O2 at home,  per daughter...  Ems reports that patient's o2 sat was 91% on RA and is in Afib....Respiratory: Positive for cough and shortness of breath...          5/16  ED Provider note        5/16 5/17 5/18 5/19 5/20 5/21           Impression:   Allowing for positioning, interstitial findings may reflect chronic change or interstitial edema, no convincing large focal consolidation.    Findings:   There is a small right pleural effusion.  No pericardial effusion.  There is nonspecific subpleural opacity in the right middle lobe, as well as several subpleural pulmonary nodules measuring up to 0.3 cm.     Labs            5/16 CXR        5/16  CT ABD Pelvis w/o contrast                                                                            Doctor, Please specify diagnosis or diagnoses associated with above clinical findings.     Doctor, please clarify respiratory status associated with the above clinical findings:  (specifically home O2)    Provider Use Only    [   ]  Chronic respiratory failure    [   ]  Other pulmonary condition: please specify: ____________________    [  x ]  Other/ please specify: __deconditioning, protien-calorie malnutirion ________________________                                                                                                                [  ] Clinically Undetermined

## 2020-05-21 NOTE — ASSESSMENT & PLAN NOTE
Daughter reported poor PO intake for extensive period of time.  Hypernatremia and Hypercalcemia on presentation.  -Started on 1/2 NS with 20 of KCl  Poor oral intake.  Changed fluids to D5 1/2 NS  Monitor BMP  -neuro checks/seizure precautions  Severe malnutrition POA--start Megace.  Worsening functional status with failure to thrive.  Palliative Care consulted.    5/19/2020  Profound malnutrition with Pre-Albumin of 6 only.  Palliative care consult   Will need a family discussion for goals of care.     5/20/2020  The patient is more alert and responded  Continue on IV fluid administration       5/21/2020  Improved hydration status  Pt is being fed mechanical soft diet  Plan to discharge with home hospice.

## 2020-05-21 NOTE — PHYSICIAN QUERY
PT Name: Cece Figueroa  MR #: [id number]     Documentation Clarification      CDS/: James Gaines RN               Contact information:   [email or web address]    This form is a permanent document in the medical record.     Query Date: May 21, 2020    By submitting this query, we are merely seeking further clarification of documentation.  Please utilize your independent clinical judgment when addressing the question(s) below.     The medical record contains the following:  Indicators Supporting Clinical Findings Location in Medical Record   x Dementia Review of Systems:  Unable to perform ROS: Dementia  5/16 HP    Contractures     x Total Care or Max Assist ...requires total assistance with ADLs, only awakens when stimulated.She will have to be fed and encouraged to eat. I anticipate she will continue to have poor intake leading to significant weight loss. She now has PPS 30% ...   5/21 PN, Palliative Medicine, NP Lizella   x Immobility or Debility She is bedbound with recent surgical intervention for right hip and femur fx and has pain with repositiotioning....    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 She has attempted to supplement her meals with Boost, but the patient sometimes refuses. She is intermittently oriented to person and place, but sometimes her mental status is lessened beyond this. 5/20 PN, Hospital medicine      5/16 HP       Provider, please specify the diagnosis or diagnoses associated with above clinical findings:         [   ] Critical illness / intensive care  myopathy   [   ] Functional quadriplegia   [  x ] General debility / deconditioning   [   ] Other diagnosis (please specify):____________________________   [  ] Clinically Undetermined                 Present on admission (POA) status:   [  x ] Yes (Y)                          [  ] Clinically Undetermined (W)  [   ] No (N)                            [   ] Documentation insufficient to determine if condition is POA (U)     Please document in your progress notes daily for the duration of treatment, until resolved, and include in your discharge summary.

## 2020-05-21 NOTE — PT/OT/SLP PROGRESS
Speech Language Pathology Treatment    Patient Name:  Cece Figueroa   MRN:  [id number]  Admitting Diagnosis: Dehydration    Recommendations:                General Recommendations:  Dysphagia therapy  Diet recommendations:  Mechanical soft, Thin   Aspiration Precautions: Alternating bites/sips, Assistance with meals, Check for pocketing/oral residue, HOB to 90 degrees, Meds crushed in puree, Monitor for s/s of aspiration and Standard aspiration precautions   General Precautions: Standard,    Communication strategies:  provide increased time to answer    Subjective     Pt responded to environmental stimuli upon ST arrival. She was pleasant and cooperative. In contracted position, less than optimal for PO intake. ST attempted physical repositioning; pt protested d/t pain. HOB adjusted to approx 90 degrees.     Patient goals: Unable to state d/t confusion.     Objective:     Has the patient been evaluated by SLP for swallowing?   Yes  Keep patient NPO? No   Current Respiratory Status: room air       Pt assessed with TL, puree, and soft solid items. Pt orally expelled 1 of 2 soft solid trials, anterior loss of TL x1 trial, and poor acceptance of puree, she significantly reduced her labial ROM during today's trials compared to yesterday's session.      Assessment:     Cece Figueroa is a 89 y.o. female with a medical diagnosis of Dehydration. She presents with mild oropharyngeal dysphagia c/b impaired rotary chew, delayed A-P transport, and decreased hyolaryngeal elevation/excursion.     Goals:   Multidisciplinary Problems     SLP Goals        Problem: SLP Goal    Goal Priority Disciplines Outcome   SLP Goal     SLP Ongoing, Progressing   Description:  1. Pt will tolerate mech soft and thin liquid without overt s/s of aspiration.                     Plan:     · Patient to be seen:  3 x/week   · Plan of Care expires:     · Plan of Care reviewed with:      · SLP Follow-Up:  Yes       Discharge  recommendations:  (TBD)   Barriers to Discharge:  None    Time Tracking:     SLP Treatment Date:   05/21/20  Speech Start Time:  0945  Speech Stop Time:  1000     Speech Total Time (min):  15 min    Billable Minutes: Treatment Swallowing Dysfunction 8min and Seld Care/Home Management Training 7min    Shakila Medley CF-SLP  05/21/2020

## 2020-05-21 NOTE — PLAN OF CARE
Problem: Fall Injury Risk  Goal: Absence of Fall and Fall-Related Injury  Outcome: Ongoing, Progressing  Intervention: Identify and Manage Contributors to Fall Injury Risk  Flowsheets (Taken 5/21/2020 0440)  Self-Care Promotion: independence encouraged;BADL personal objects within reach;BADL personal routines maintained  Medication Review/Management: medications reviewed     Problem: Skin Injury Risk Increased  Goal: Skin Health and Integrity  Outcome: Ongoing, Progressing  Intervention: Optimize Skin Protection  Flowsheets (Taken 5/21/2020 0440)  Pressure Reduction Devices: pressure-redistributing mattress utilized  Skin Protection: incontinence pads utilized;hydrocolloids used  Head of Bed (HOB): HOB elevated

## 2020-05-21 NOTE — NURSING
No distress at time of medication administration. Crushed meds given. Tolerated well. Fluids running. Tele box #2410 Will continue to monitor.

## 2020-05-21 NOTE — PROGRESS NOTES
[facility] Hospital Medicine  Progress Note    Patient Name: Cece Figueroa  MRN: [id number]  Patient Class: IP- Inpatient   Admission Date: 5/16/2020  Length of Stay: 5 days  Attending Physician: Gian Butler MD  Primary Care Provider: Janel Barajas MD        Subjective:     Principal Problem:Dehydration        HPI:  Cece Figueroa 89 y.o. female with no PMHx presents to the hospital with a chief complaint of leg swelling. The daughter reports she noticed a small area of left swelling behind her left knee and was concerned it was a DVT so she brought the patient to the hospital. She reports a gradual decline in mental status for the last 6 months with a gradual refusal to eat. She has attempted to supplement her meals with Boost, but the patient sometimes refuses. She is intermittently oriented to person and place, but sometimes her mental status is lessened beyond this. She has no medical history and takes no medications at home. She is largely bed bound at home. She has had a small pressure wound on her sacrum and ankle the family has been covering with hydrocolloid. Mckayla [phone] and her Brother who currently has power of  have both agreed for the patient to be DNR/DNI. She denies any history of her mother having Afib, but reports she would be ok with a blood thinner if necessary. The daughter personally has afib and is acceptable to Eliquis as blood thinner.     In the ED, Sodium 151, Calcium 13.9, BUN 39, troponin 0.045, CT with distending rectum and signs of possible stercoral colitis.     Overview/Hospital Course:  Cece Figueroa 89 y.o. female was brought in by family secondary to leg swelling.  Patient was noted to be dehydrated on presentation and started on IVF's.  US does show acute deep venous thrombosis in the right common femoral vein and popliteal veins.  Started on therapeutic Lovenox.  Patient with decreasing oral intake and worsening  "functional status over past few months and exacerbated by recent hip fracture.  Palliative Care consulted.    05/19/2020  Continue to have worsening of the dysphagia  Prealbumin at very level of 6      5/20/2020  The patient was seen by SPL  Recommends mechanical soft diet    5/21/2020  Pt with protein calorie malnutrition   IV fluid has corrected hypernatremia and hypercalcemia   Continue current care.     Interval History: Poorly responsive. Responds"yes" "No"   Pt verablized, " I am very sick"     Review of Systems   Unable to perform ROS: Mental status change   HENT: Positive for sinus pressure.      Objective:     Vital Signs (Most Recent):  Temp: 97.7 °F (36.5 °C) (05/21/20 1637)  Pulse: 83 (05/21/20 1637)  Resp: 19 (05/21/20 1637)  BP: (!) 108/51 (05/21/20 1637)  SpO2: 98 % (05/21/20 1637) Vital Signs (24h Range):  Temp:  [97.7 °F (36.5 °C)-98.3 °F (36.8 °C)] 97.7 °F (36.5 °C)  Pulse:  [] 83  Resp:  [16-20] 19  SpO2:  [95 %-99 %] 98 %  BP: (108-193)/(51-93) 108/51     Weight: 58.5 kg (129 lb)  Body mass index is 23.59 kg/m².    Intake/Output Summary (Last 24 hours) at 5/21/2020 1843  Last data filed at 5/21/2020 1758  Gross per 24 hour   Intake 695 ml   Output 1880 ml   Net -1185 ml      Physical Exam   Constitutional: She appears well-developed. No distress.   thin   HENT:   Head: Normocephalic and atraumatic.   Right Ear: External ear normal.   Left Ear: External ear normal.   Eyes: Conjunctivae and EOM are normal. Right eye exhibits no discharge. Left eye exhibits no discharge.   Neck: Normal range of motion. No thyromegaly present.   Cardiovascular: Normal rate and regular rhythm.   No murmur heard.  Pulmonary/Chest: Effort normal and breath sounds normal. No respiratory distress.   Abdominal: Soft. Bowel sounds are normal. She exhibits no distension and no mass. There is no tenderness.   Musculoskeletal: She exhibits no edema or deformity.   Small amount of erythema to left fibular head non-tender "   Neurological: No cranial nerve deficit.   Awakes to verbal stimuli.  Answer simple yes or no questions.   Skin: Skin is warm and dry.   Psychiatric: She has a normal mood and affect. Her behavior is normal.   Nursing note and vitals reviewed.      Significant Labs:   BMP:   Recent Labs   Lab 05/21/20  0436   GLU 86      K 4.6   *   CO2 22*   BUN 11   CREATININE 0.6   CALCIUM 10.9*     CBC:   Recent Labs   Lab 05/20/20  0348 05/21/20  0436   WBC 10.50 10.21   HGB 9.1* 9.9*   HCT 30.2* 32.4*    270       Significant Imaging: I have reviewed all pertinent imaging results/findings within the past 24 hours.      Assessment/Plan:      * Dehydration  Daughter reported poor PO intake for extensive period of time.  Hypernatremia and Hypercalcemia on presentation.  -Started on 1/2 NS with 20 of KCl  Poor oral intake.  Changed fluids to D5 1/2 NS  Monitor BMP  -neuro checks/seizure precautions  Severe malnutrition POA--start Megace.  Worsening functional status with failure to thrive.  Palliative Care consulted.    5/19/2020  Profound malnutrition with Pre-Albumin of 6 only.  Palliative care consult   Will need a family discussion for goals of care.     5/20/2020  The patient is more alert and responded  Continue on IV fluid administration       5/21/2020  Improved hydration status  Pt is being fed mechanical soft diet  Plan to discharge with home hospice.       Acute deep vein thrombosis (DVT) of femoral vein of right lower extremity  Continue full dose Lovenox.  Will transition to oral DOACs upon discharge.     Palliative care encounter  Discussed code status with Daughter Mckayla. She reports she and her brother discussed this and have decided for DNR/DNI. Her brother has power of  but is attempting to transfer this to her. DNR order placed in chart. Will consult palliative care as patient has had a gradual decline for the last 6 months and has begun to refuse to eat.   Severe Malnutrition  POA  Failure to thrive POA    5/20/2020  Recommend home hospice  Case communicated with the family  The son reported he will speak with the his sis and niece who are taking care of the patient  [facility]     5/21/2020  To be discharged with home hospice.     Elevated troponin  Troponin 0.045. No complaints of chest pain, and EKG of sinus.   Probable demand ischemia.  Would not pursue further work up.    Hypercalcemia  Poor oral intake of solids and liquids.   Continue with IV fluid administration.   Improvign calcium level       VTE Risk Mitigation (From admission, onward)         Ordered     enoxaparin injection 60 mg  Every 12 hours (non-standard times)      05/17/20 0924     IP VTE HIGH RISK PATIENT  Once      05/16/20 1759     Place sequential compression device  Until discontinued      05/16/20 1759                      Gian Butler MD  Department of Hospital Medicine   [facility]

## 2020-05-21 NOTE — PHYSICIAN QUERY
PT Name: Cece Figueroa  MR #: [id number]    CAUSE AND EFFECT RELATIONSHIP CLARIFICATION: device and DVT     CDS/: James Gaines RN              Contact information:   [email or web address]    This form is a permanent document in the medical record.     Query Date: May 21, 2020    By submitting this query, we are merely seeking further clarification of documentation. Please utilize your independent clinical judgment when addressing the question(s) below.    Supporting Clinical Findings   Location in Medical Record     INTRAMEDULLARY RODDING OF FEMUR Right 3/30/2020    Procedure: INSERTION, INTRAMEDULLARY ARMANDO, FEMUR-HIP;  Surgeon: Armen Norwood MD;  Location: [facility] OR;  Service: Orthopedics;  Laterality: Right;  JALEESA     chief Complaint:       Leg bruising:   EMS reports pt family concerned she has a blood clot in her leg due to bruising in the right inner leg. hip surgery about 8 weeks ago.....The daughter personally has afib and is acceptable to Eliquis as blood thinner. .....      Impression:  Acute deep venous thrombosis in the right common femoral vein, femoral vein, and popliteal veins.  No DVT in the left lower extremity.     Acute deep vein thrombosis (DVT) of femoral vein of right lower extremity  Continue full dose Lovenox.                                                                                                                                                                                5/16 HP- Past surgical History          5/16  HP          5/16  Lower extremity Veins Bilateral     5/17 PN, Hospital medicine                                                                                                                                                                                                    Provider, please clarify if there is any clinical correlation between:      Intramedullary Rodding of Right femur (device)    and     DVT    .           Are the  conditions:      [  ] Due to or associated with each other   [  ] Unrelated to each other   [  ] Other explanation (Please Specify): ______________   [x  ] Clinically Undetermined                                                                               Please document in your progress notes daily for the duration of treatment until resolved and include in your discharge summary.

## 2020-05-21 NOTE — SUBJECTIVE & OBJECTIVE
"Interval History: Poorly responsive. Responds"yes" "No"   Pt verablized, " I am very sick"     Review of Systems   Unable to perform ROS: Mental status change   HENT: Positive for sinus pressure.      Objective:     Vital Signs (Most Recent):  Temp: 97.7 °F (36.5 °C) (05/21/20 1637)  Pulse: 83 (05/21/20 1637)  Resp: 19 (05/21/20 1637)  BP: (!) 108/51 (05/21/20 1637)  SpO2: 98 % (05/21/20 1637) Vital Signs (24h Range):  Temp:  [97.7 °F (36.5 °C)-98.3 °F (36.8 °C)] 97.7 °F (36.5 °C)  Pulse:  [] 83  Resp:  [16-20] 19  SpO2:  [95 %-99 %] 98 %  BP: (108-193)/(51-93) 108/51     Weight: 58.5 kg (129 lb)  Body mass index is 23.59 kg/m².    Intake/Output Summary (Last 24 hours) at 5/21/2020 1843  Last data filed at 5/21/2020 1758  Gross per 24 hour   Intake 695 ml   Output 1880 ml   Net -1185 ml      Physical Exam   Constitutional: She appears well-developed. No distress.   thin   HENT:   Head: Normocephalic and atraumatic.   Right Ear: External ear normal.   Left Ear: External ear normal.   Eyes: Conjunctivae and EOM are normal. Right eye exhibits no discharge. Left eye exhibits no discharge.   Neck: Normal range of motion. No thyromegaly present.   Cardiovascular: Normal rate and regular rhythm.   No murmur heard.  Pulmonary/Chest: Effort normal and breath sounds normal. No respiratory distress.   Abdominal: Soft. Bowel sounds are normal. She exhibits no distension and no mass. There is no tenderness.   Musculoskeletal: She exhibits no edema or deformity.   Small amount of erythema to left fibular head non-tender   Neurological: No cranial nerve deficit.   Awakes to verbal stimuli.  Answer simple yes or no questions.   Skin: Skin is warm and dry.   Psychiatric: She has a normal mood and affect. Her behavior is normal.   Nursing note and vitals reviewed.      Significant Labs:   BMP:   Recent Labs   Lab 05/21/20  0436   GLU 86      K 4.6   *   CO2 22*   BUN 11   CREATININE 0.6   CALCIUM 10.9*     CBC: "   Recent Labs   Lab 05/20/20  0348 05/21/20  0436   WBC 10.50 10.21   HGB 9.1* 9.9*   HCT 30.2* 32.4*    270       Significant Imaging: I have reviewed all pertinent imaging results/findings within the past 24 hours.

## 2020-05-22 VITALS
OXYGEN SATURATION: 96 % | HEART RATE: 82 BPM | BODY MASS INDEX: 23.74 KG/M2 | WEIGHT: 129 LBS | TEMPERATURE: 98 F | RESPIRATION RATE: 19 BRPM | HEIGHT: 62 IN | DIASTOLIC BLOOD PRESSURE: 48 MMHG | SYSTOLIC BLOOD PRESSURE: 94 MMHG

## 2020-05-22 LAB
ANION GAP SERPL CALC-SCNC: 6 MMOL/L (ref 8–16)
BASOPHILS # BLD AUTO: 0.04 K/UL (ref 0–0.2)
BASOPHILS NFR BLD: 0.4 % (ref 0–1.9)
BUN SERPL-MCNC: 8 MG/DL (ref 10–30)
CALCIUM SERPL-MCNC: 10.8 MG/DL (ref 8.7–10.5)
CHLORIDE SERPL-SCNC: 112 MMOL/L (ref 95–110)
CO2 SERPL-SCNC: 20 MMOL/L (ref 23–29)
CREAT SERPL-MCNC: 0.6 MG/DL (ref 0.5–1.4)
DIFFERENTIAL METHOD: ABNORMAL
EOSINOPHIL # BLD AUTO: 0.3 K/UL (ref 0–0.5)
EOSINOPHIL NFR BLD: 2.6 % (ref 0–8)
ERYTHROCYTE [DISTWIDTH] IN BLOOD BY AUTOMATED COUNT: 16.1 % (ref 11.5–14.5)
EST. GFR  (AFRICAN AMERICAN): >60 ML/MIN/1.73 M^2
EST. GFR  (NON AFRICAN AMERICAN): >60 ML/MIN/1.73 M^2
GLUCOSE SERPL-MCNC: 84 MG/DL (ref 70–110)
HCT VFR BLD AUTO: 33.8 % (ref 37–48.5)
HGB BLD-MCNC: 10.3 G/DL (ref 12–16)
IMM GRANULOCYTES # BLD AUTO: 0.15 K/UL (ref 0–0.04)
IMM GRANULOCYTES NFR BLD AUTO: 1.4 % (ref 0–0.5)
LYMPHOCYTES # BLD AUTO: 2.3 K/UL (ref 1–4.8)
LYMPHOCYTES NFR BLD: 21.2 % (ref 18–48)
MCH RBC QN AUTO: 27.8 PG (ref 27–31)
MCHC RBC AUTO-ENTMCNC: 30.5 G/DL (ref 32–36)
MCV RBC AUTO: 91 FL (ref 82–98)
MONOCYTES # BLD AUTO: 0.9 K/UL (ref 0.3–1)
MONOCYTES NFR BLD: 8 % (ref 4–15)
NEUTROPHILS # BLD AUTO: 7.1 K/UL (ref 1.8–7.7)
NEUTROPHILS NFR BLD: 66.4 % (ref 38–73)
NRBC BLD-RTO: 0 /100 WBC
PLATELET # BLD AUTO: 312 K/UL (ref 150–350)
PMV BLD AUTO: 11 FL (ref 9.2–12.9)
POTASSIUM SERPL-SCNC: 4.9 MMOL/L (ref 3.5–5.1)
RBC # BLD AUTO: 3.7 M/UL (ref 4–5.4)
SODIUM SERPL-SCNC: 138 MMOL/L (ref 136–145)
WBC # BLD AUTO: 10.68 K/UL (ref 3.9–12.7)

## 2020-05-22 PROCEDURE — 25000003 PHARM REV CODE 250: Performed by: HOSPITALIST

## 2020-05-22 PROCEDURE — 80048 BASIC METABOLIC PNL TOTAL CA: CPT

## 2020-05-22 PROCEDURE — 63600175 PHARM REV CODE 636 W HCPCS: Performed by: HOSPITALIST

## 2020-05-22 PROCEDURE — 85025 COMPLETE CBC W/AUTO DIFF WBC: CPT

## 2020-05-22 PROCEDURE — 36415 COLL VENOUS BLD VENIPUNCTURE: CPT

## 2020-05-22 PROCEDURE — S0179 MEGESTROL 20 MG: HCPCS | Performed by: HOSPITALIST

## 2020-05-22 PROCEDURE — 97535 SELF CARE MNGMENT TRAINING: CPT

## 2020-05-22 PROCEDURE — 25000003 PHARM REV CODE 250: Performed by: PHYSICIAN ASSISTANT

## 2020-05-22 PROCEDURE — 94761 N-INVAS EAR/PLS OXIMETRY MLT: CPT

## 2020-05-22 PROCEDURE — 92526 ORAL FUNCTION THERAPY: CPT

## 2020-05-22 RX ORDER — METOPROLOL TARTRATE 25 MG/1
12.5 TABLET, FILM COATED ORAL 2 TIMES DAILY
Qty: 30 TABLET | Refills: 11 | Status: SHIPPED | OUTPATIENT
Start: 2020-05-22 | End: 2021-05-22

## 2020-05-22 RX ADMIN — MEGESTROL ACETATE 200 MG: 40 SUSPENSION ORAL at 09:05

## 2020-05-22 RX ADMIN — ENOXAPARIN SODIUM 60 MG: 60 INJECTION SUBCUTANEOUS at 12:05

## 2020-05-22 RX ADMIN — DEXTROSE, SODIUM CHLORIDE, AND POTASSIUM CHLORIDE: 5; .45; .15 INJECTION INTRAVENOUS at 09:05

## 2020-05-22 RX ADMIN — METOPROLOL TARTRATE 12.5 MG: 25 TABLET, FILM COATED ORAL at 09:05

## 2020-05-22 NOTE — PROGRESS NOTES
Spoke with son of the patient over the phone regarding blood thinner.   The son understood the explanation . Any question was welcomed.  The pt to follow up with cardiology in 4-6 weeks time to re-evaluate the need for AC.   Also the risk/benefit explained and adverse effect and what to do if adverse effect happens.     The patient's son verbalized understanding.

## 2020-05-22 NOTE — PLAN OF CARE
05/22/20 0856   Post-Acute Status   Post-Acute Authorization Hospice   Hospice Status Pending Payor Review   Patient choice form signed by patient/caregiver List with quality metrics by geographic area provided   Discharge Delays None known at this time   Discharge Plan   Discharge Plan A Hospice/home     [facility] unable to accept due to location. KENTON faxed clinicals to [facility]. KENTON waiting confirmation services will be provided.     10:14am  KENTON contacted pt's daughter, Mckayla, to inform [facility] and [facility] are unable to accept because they are out of service area. According to Mckayla, she lives in [place], and her brother's address in the the one on file. KENTON re faxed referral to [facility] and updated address.     12:03pm  [facility] unable to accept pt at this time. KENTON notified family, orders sent to [facility]. KENTON waiting confirmation services will be provided. KENTON contacted Swetha with [facility] to notify orders are in.     1:30pm  KENTON spoke with pt's daughter, and confirmed pt has all needed equipment at home. According to Mckayla, she will meet with Swetha at 2:00pm to sign paperwork, however, pt will no need to wait to travel home. KENTON will arrange transportation once authorization is obtained via N.

## 2020-05-22 NOTE — CONSULTS
KENTON contacted, CHANTAL Ceballos (PHN), to get approval for stretcher. Per CHANTAL Ceballos, she will review chart and get back to SW shortly.     2:45pm  KENTON received authorization from Lin (PHN). According to CHANTAL Ceballos, case number is [id number]. KENTON contacted [facility] at [phone] to arrange transportation. KENTON spoke withEleno, transportation arranged for within the hour. Pt's nurse, Lissa, notified.

## 2020-05-22 NOTE — PLAN OF CARE
05/22/20 0808   Medicare Message   Important Message from Medicare regarding Discharge Appeal Rights Given to patient/caregiver;Explained to patient/caregiver;Other (comments)  (KENTON spoke with pt's daughter, Mckayla, via phone to explain IMM. Mckayla verbalized understanding. )   Date IMM was signed 05/22/20   Time IMM was signed 0810

## 2020-05-22 NOTE — CONSULTS
KENTON contacted pt's daughter to arrange hospice. According to Mckayla, she would prefer to stay within the [facility] system, and would prefer [facility]. KENTON faxed PACKET, MAR, and palliative care notes to [facility] via [facility]. KENTON waiting confirmation services will be provided.

## 2020-05-22 NOTE — PLAN OF CARE
[facility]  Department of Hospital Medicine  [address] [place] [address]  [phone] after hours  [phone] fax    HOSPICE  ORDERS    05/22/2020    Admit to Hospice:  Home Service  Diagnoses:   Active Hospital Problems    Diagnosis  POA    *Dehydration [E86.0]  Yes    Hypercalcemia [E83.52]  Yes    Elevated troponin [R79.89]  Yes    Palliative care encounter [Z51.5]  Not Applicable    Acute deep vein thrombosis (DVT) of femoral vein of right lower extremity [I82.411]  Yes      Resolved Hospital Problems    Diagnosis Date Resolved POA    Hypernatremia [E87.0] 05/21/2020 Yes    Sinus arrhythmia [I49.8] 05/20/2020 Yes       Hospice Qualifying Diagnoses: Severe Malnutrition        Patient has a life expectancy < 6 months due to: Severe Malnutrition   1) Primary Hospice Diagnosis:    Comorbid Conditions Contributing to Decline:   Vital Signs: Routine per Hospice Protocol.    Code Status: DNR  Allergies: Review of patient's allergies indicates:  No Known Allergies    Diet: Regular     Activities: As tolerated    Oxygen: 2LPM   Other Miscellaneous Care:    Medications:      Cristiane Figueroa   Home Medication Instructions LEAH:[id number]    Printed on:05/22/20 1141   Medication Information                      acetaminophen (TYLENOL) 160 mg/5 mL (5 mL) Susp  Take by mouth.               Apixaban 10 mg po q12 hours till 05/29/2020  Apixaban 5 mg twice daily from 05/30/2020 on for one month, then follow up with Cardiology for DVT scan and decide about continuation of the Apixaban.            HYDROcodone-acetaminophen (NORCO)  mg per tablet  Take 1 tablet by mouth every 6 (six) hours as needed for Pain.             multivitamin Liqd  Take by mouth.                     _________________________________  Gian Butler MD  05/22/2020

## 2020-05-22 NOTE — NURSING
Patient discharged to home hospice per MD order.  Peripheral IV removed.  Catheter tip intact.  No distress noted.  Discharge instructions explained to patient's daughter who is her primary caregiver over the phone, family verbalized understanding of plan of care after discharge.  AVS placed in patient belonging bag to go home with patient.  VSS.  Afebrile.  No complaints of pain, N/V, diarrhea, or SOB. Patient's family provided opportunity to ask any further questions related to discharge care.  Patient left with belongings to Emergency Department Main Entrance via stretcher with EMS transport.

## 2020-05-22 NOTE — PT/OT/SLP PROGRESS
Speech Language Pathology Treatment    Patient Name:  Cece Figueroa   MRN:  [id number]  Admitting Diagnosis: Dehydration    Recommendations:                 General Recommendations:  Dysphagia therapy  Diet recommendations:  Mechanical soft, Thin   Aspiration Precautions: Alternating bites/sips, Assistance with meals, Check for pocketing/oral residue, HOB to 90 degrees, Meds crushed in puree, Monitor for s/s of aspiration and Standard aspiration precautions   General Precautions: Standard,    Communication strategies:  provide increased time to answer    Subjective     Pt awake and alert with meal tray present at bedside. She told ST she was leaving today. Pt agreed to participate in ST session. HOB adjusted to 90 degrees.     Objective:     Has the patient been evaluated by SLP for swallowing?   Yes  Keep patient NPO? No   Current Respiratory Status: room air      Pt assessed with trials of TL, and soft solid items. Across trials no overt s/s of aspiration,however, pt demonstrated excess chewing when prompted she stated that she had something in her mouth. ST checked, no oral cavity residue visible.     Assessment:     Cece Figueroa is a 89 y.o. female with a medical diagnosis of Dehydration. She presents with mild oropharyngeal dysphagia c/b impaired rotary chew, delayed A-P transport, and decreased hyolaryngeal elevation/excursion.        Goals:   Multidisciplinary Problems     SLP Goals     Not on file          Multidisciplinary Problems (Resolved)        Problem: SLP Goal    Goal Priority Disciplines Outcome   SLP Goal   (Resolved)     SLP Met   Description:  1. Pt will tolerate mech soft and thin liquid without overt s/s of aspiration.                     Plan:     · Patient to be seen:  3 x/week   · Plan of Care expires:     · Plan of Care reviewed with:      · SLP Follow-Up:  Yes       Discharge recommendations:  (TBD)   Barriers to Discharge:  None    Time Tracking:     SLP Treatment  Date:   05/22/20  Speech Start Time:  1250  Speech Stop Time:  1305     Speech Total Time (min):  15 min    Billable Minutes: Treatment Swallowing Dysfunction 8min and Seld Care/Home Management Training 7min    Shakila Medley CF-SLP  05/22/2020

## 2020-05-22 NOTE — PROGRESS NOTES
PALLIATIVE CARE PROGRESS NOTE:    Received call from KENTON Vick earlier today regarding home hospice care for patient (pt lives in [place] which is out of area, but now going to live in [place]).  Patient was declined for hospice by [facility].  Suggested consider [facility] so patient can be evaluated for home Palliative Medicine visits or hospice care.  She is appropriate for hospice care; age 89 years, hip/femur fracture in March, DVT, dehydration and dysphagia with very poor oral intake as evidenced by metabolic derangement, albumin 2.7, prealbumin 6. Has had significant decline and PPS is 30. Her prognosis is guarded/poor.  Patient/family have decided to forgo any aggressive treatment and want to focus on comfort.      Gabriela Gregg, DANTEN, RN, CCRN, CHPN   Palliative Care Nurse Coordinator   [facility]  [phone]

## 2020-05-22 NOTE — PLAN OF CARE
Problem: Fall Injury Risk  Goal: Absence of Fall and Fall-Related Injury  Outcome: Met  Intervention: Identify and Manage Contributors to Fall Injury Risk  Flowsheets (Taken 5/22/2020 1521)  Self-Care Promotion: independence encouraged; BADL personal objects within reach; BADL personal routines maintained  Medication Review/Management: medications reviewed  Intervention: Promote Injury-Free Environment  Flowsheets (Taken 5/22/2020 1521)  Safety Promotion/Fall Prevention: assistive device/personal item within reach; medications reviewed; side rails raised x 2; Fall Risk reviewed with patient/family; /camera at bedside  Environmental Safety Modification: assistive device/personal items within reach; clutter free environment maintained; room organization consistent     Problem: Skin Injury Risk Increased  Goal: Skin Health and Integrity  Outcome: Met  Intervention: Optimize Skin Protection  Flowsheets (Taken 5/22/2020 1521)  Pressure Reduction Devices: heel offloading device utilized  Head of Bed (HOB): HOB at 30 degrees  Intervention: Promote and Optimize Oral Intake  Flowsheets (Taken 5/22/2020 1521)  Oral Nutrition Promotion: --     Problem: Adult Inpatient Plan of Care  Goal: Plan of Care Review  Outcome: Met  Goal: Patient-Specific Goal (Individualization)  Outcome: Met  Goal: Absence of Hospital-Acquired Illness or Injury  Outcome: Met  Intervention: Identify and Manage Fall Risk  Flowsheets (Taken 5/22/2020 1521)  Safety Promotion/Fall Prevention: assistive device/personal item within reach; medications reviewed; side rails raised x 2; Fall Risk reviewed with patient/family; /camera at bedside  Intervention: Prevent VTE (venous thromboembolism)  Flowsheets (Taken 5/22/2020 1521)  VTE Prevention/Management: remove, assess skin and reapply sequential compression device; fluids promoted; intravenous hydration  Goal: Optimal Comfort and Wellbeing  Outcome: Met  Intervention: Provide  Person-Centered Care  Flowsheets (Taken 5/22/2020 1521)  Trust Relationship/Rapport: care explained; questions answered; questions encouraged  Goal: Readiness for Transition of Care  Outcome: Met  Goal: Rounds/Family Conference  Outcome: Met     Problem: Wound  Goal: Optimal Wound Healing  Outcome: Met  Intervention: Promote Effective Wound Healing  Flowsheets (Taken 5/22/2020 1521)  Oral Nutrition Promotion: calorie dense foods provided  Sleep/Rest Enhancement: awakenings minimized  Pain Management Interventions: quiet environment facilitated     Problem: Coping Ineffective  Goal: Effective Coping  Outcome: Met  Intervention: Support and Enhance Coping Strategies  Flowsheets (Taken 5/22/2020 1521)  Environmental Support: calm environment promoted

## 2020-05-23 NOTE — DISCHARGE SUMMARY
[facility] Hospital Medicine  Discharge Summary      Patient Name: Cece Figueroa  MRN: [id number]  Admission Date: 5/16/2020  Hospital Length of Stay: 6 days  Discharge Date and Time: 5/22/2020  4:37 PM  Attending Physician: No att. providers found   Discharging Provider: Gian Butler MD  Primary Care Provider: Janel Barajas MD        HPI: Miss Cece Figueroa 89 y.o.w. female with no PMHx presents to the hospital with a chief complaint of leg swelling. The daughter reports she noticed a small area of left swelling behind her left knee and was concerned it was a DVT so she brought the patient to the hospital. She reports a gradual decline in mental status for the last 6 months with a gradual refusal to eat. She has attempted to supplement her meals with Boost, but the patient sometimes refuses. She is intermittently oriented to person and place, but sometimes her mental status is lessened beyond this. She has no medical history and takes no medications at home. She is largely bed bound at home. She has had a small pressure wound on her sacrum and ankle the family has been covering with hydrocolloid. Mckayla [phone] and her Brother who currently has power of  have both agreed for the patient to be DNR/DNI. She denies any history of her mother having Afib, but reports she would be ok with a blood thinner if necessary. The daughter personally has afib and is acceptable to Eliquis as blood thinner.      In the ED, Sodium 151, Calcium 13.9, BUN 39, troponin 0.045, CT with distending rectum and signs of possible stercoral colitis.      History reviewed. No pertinent past medical history.    * No surgery found *      Hospital Course:   Cece Figueroa 89 y.o. female was brought in by family secondary to leg swelling.  Patient was noted to be dehydrated on presentation and started on IVF's.  US does show acute deep venous thrombosis in the right common femoral vein  and popliteal veins.  Started on therapeutic Lovenox.    Pt was continued on IV fluid , which helped correction of hyponatremia and hypercalcemia.  Patient gradually started communicated as well.   Given the advanced stage, severe protein-calorie malnutrition, incompelte caregiver support, the patient was also seen by palliative care management who recommended home hospice.    The family agreed, and was discharged home with home hospice.         Consults:   Consults (From admission, onward)        Status Ordering Provider     Inpatient consult to Palliative Care  Once     Provider:  Tiffanie Cohen NP    Completed JAZMYNE VIVAS     Inpatient consult to Social Work  Once     Provider:  (Not yet assigned)    Completed CHEYENNE MCKEON     Inpatient consult to Social Work/Case Management  Once     Provider:  (Not yet assigned)    Completed CHEYENNE MCKEON          Final Active Diagnoses:    Diagnosis Date Noted POA    PRINCIPAL PROBLEM:  Dehydration [E86.0] 05/16/2020 Yes    Hypercalcemia [E83.52] 05/16/2020 Yes    Elevated troponin [R79.89] 05/16/2020 Yes    Palliative care encounter [Z51.5] 05/16/2020 Not Applicable    Acute deep vein thrombosis (DVT) of femoral vein of right lower extremity [I82.411] 05/16/2020 Yes      Problems Resolved During this Admission:    Diagnosis Date Noted Date Resolved POA    Hypernatremia [E87.0] 05/16/2020 05/21/2020 Yes    Sinus arrhythmia [I49.8] 05/16/2020 05/20/2020 Yes      Discharged Condition: stable    Disposition: Home or Self Care    Follow Up:    Patient Instructions:      Ambulatory referral/consult to Cardiology   Standing Status: Future   Referral Priority: Routine Referral Type: Consultation   Referral Reason: Specialty Services Required   Requested Specialty: Cardiology   Number of Visits Requested: 1     Notify your health care provider if you experience any of the following:  temperature >100.4     Notify your health care provider if you experience any of the  following:  difficulty breathing or increased cough     Activity as tolerated     Medications:  Reconciled Home Medications:      Medication List      START taking these medications    * apixaban 5 mg Tab  Commonly known as:  ELIQUIS  Take 2 tablets (10 mg total) by mouth 2 (two) times daily. for 7 days     * apixaban 5 mg Tab  Commonly known as:  ELIQUIS  Take 1 tablet (5 mg total) by mouth 2 (two) times daily.  Start taking on:  May 29, 2020     metoprolol tartrate 25 MG tablet  Commonly known as:  LOPRESSOR  Take 0.5 tablets (12.5 mg total) by mouth 2 (two) times daily.         * This list has 2 medication(s) that are the same as other medications prescribed for you. Read the directions carefully, and ask your doctor or other care provider to review them with you.            CONTINUE taking these medications    acetaminophen 160 mg/5 mL (5 mL) Susp  Commonly known as:  TYLENOL  Take by mouth.     multivitamin Liqd  Take by mouth.        STOP taking these medications    enoxaparin 40 mg/0.4 mL Syrg  Commonly known as:  LOVENOX     HYDROcodone-acetaminophen  mg per tablet  Commonly known as:  NORCO            Significant Diagnostic Studies:     Pending Diagnostic Studies:     None        Indwelling Lines/Drains at time of discharge:   Lines/Drains/Airways     None                 Time spent on the discharge of patient: 30 minutes  Patient was seen and examined on the date of discharge and determined to be suitable for discharge.         Gian Butler MD  Department of Hospital Medicine  [facility]

## 2020-06-30 ENCOUNTER — DOCUMENT SCAN (OUTPATIENT)
Dept: HOME HEALTH SERVICES | Facility: HOSPITAL | Age: 85
End: 2020-06-30
Payer: MEDICARE

## 2021-04-16 ENCOUNTER — PATIENT MESSAGE (OUTPATIENT)
Dept: RESEARCH | Facility: HOSPITAL | Age: 86
End: 2021-04-16

## (undated) DEVICE — DRAPE C-ARMOR EQUIPMENT COVER

## (undated) DEVICE — COVER OVERHEAD SURG LT BLUE

## (undated) DEVICE — SUT VICRYL 2-0 36 CT-1

## (undated) DEVICE — K-WIRE GAMMA 3.2MM DIAX450MM L
Type: IMPLANTABLE DEVICE | Site: HIP | Status: NON-FUNCTIONAL
Removed: 2020-03-30

## (undated) DEVICE — BLANKET UPPER BODY 78.7X29.9IN

## (undated) DEVICE — WIRE KIRSCHNER T2 3X285MM SS
Type: IMPLANTABLE DEVICE | Site: HIP | Status: NON-FUNCTIONAL
Removed: 2020-03-30

## (undated) DEVICE — NDL SAFETY 22G X 1.5 ECLIPSE

## (undated) DEVICE — POSITIONER IV ARMBOARD FOAM

## (undated) DEVICE — ELECTRODE REM PLYHSV RETURN 9

## (undated) DEVICE — REAMER MOD BIXCUT 8X48MM STER.

## (undated) DEVICE — SUT VICRYL PLUS ANTIBACT

## (undated) DEVICE — CANISTER SUCTION 2 LTR

## (undated) DEVICE — PAD CAST SPECIALIST STRL 6

## (undated) DEVICE — GLOVE BIOGEL SZ 8 1/2

## (undated) DEVICE — PAD TRACTION BOOT

## (undated) DEVICE — DRESSING AQUACEL AG ADV 3.5X6

## (undated) DEVICE — SLEEVE SCD EXPRESS CALF MEDIUM

## (undated) DEVICE — BANDAGE ACE ELASTIC 6"

## (undated) DEVICE — SEE MEDLINE ITEM 157117

## (undated) DEVICE — DRAPE STERI U-SHAPED 47X51IN

## (undated) DEVICE — SEE MEDLINE ITEM 157166

## (undated) DEVICE — SOL 9P NACL IRR PIC IL

## (undated) DEVICE — GLOVE SURG BIOGEL LATEX SZ 7.5

## (undated) DEVICE — GUIDE WIRE 3.0X1000MM BALL TIP
Type: IMPLANTABLE DEVICE | Site: HIP | Status: NON-FUNCTIONAL
Removed: 2020-03-30

## (undated) DEVICE — Device

## (undated) DEVICE — SEE MEDLINE ITEM 146345

## (undated) DEVICE — DRAPE PLASTIC U 60X72

## (undated) DEVICE — UNDERGLOVES BIOGEL PI SIZE 8

## (undated) DEVICE — GLOVE BIOGEL SURGEONS SIZE 9

## (undated) DEVICE — SEE MEDLINE ITEM 152622

## (undated) DEVICE — SYR ONLY LUER LOCK 20CC

## (undated) DEVICE — UNGERGLOVE BIOGEL PI INDIC SZ9

## (undated) DEVICE — APPLICATOR CHLORAPREP ORN 26ML

## (undated) DEVICE — STAPLER SKIN PROXIMATE WIDE

## (undated) DEVICE — BIT DRILL T2 4.2 X 180MM L

## (undated) DEVICE — KIT PT CARE HANA PROFX SSXT

## (undated) DEVICE — DRAPE C-ARM FOR MOBILE XRAY

## (undated) DEVICE — DRESSING COVER AQUACEL AG SURG

## (undated) DEVICE — SUPPORT ULNA NERVE PROTECTOR